# Patient Record
Sex: FEMALE | Race: ASIAN | NOT HISPANIC OR LATINO | URBAN - METROPOLITAN AREA
[De-identification: names, ages, dates, MRNs, and addresses within clinical notes are randomized per-mention and may not be internally consistent; named-entity substitution may affect disease eponyms.]

---

## 2017-11-13 ENCOUNTER — EMERGENCY (EMERGENCY)
Facility: HOSPITAL | Age: 82
LOS: 1 days | Discharge: HOME | End: 2017-11-13
Attending: EMERGENCY MEDICINE | Admitting: EMERGENCY MEDICINE
Payer: MEDICARE

## 2017-11-13 VITALS
WEIGHT: 130.07 LBS | OXYGEN SATURATION: 95 % | HEIGHT: 62 IN | RESPIRATION RATE: 16 BRPM | DIASTOLIC BLOOD PRESSURE: 85 MMHG | TEMPERATURE: 98 F | SYSTOLIC BLOOD PRESSURE: 165 MMHG | HEART RATE: 56 BPM

## 2017-11-13 VITALS — SYSTOLIC BLOOD PRESSURE: 156 MMHG | HEART RATE: 60 BPM | DIASTOLIC BLOOD PRESSURE: 82 MMHG | RESPIRATION RATE: 16 BRPM

## 2017-11-13 LAB
ANION GAP SERPL CALC-SCNC: 3 MMOL/L — LOW (ref 5–17)
APTT BLD: 47.5 SEC — HIGH (ref 27.5–37.4)
BASOPHILS # BLD AUTO: 0.1 K/UL — SIGNIFICANT CHANGE UP (ref 0–0.2)
BASOPHILS NFR BLD AUTO: 1.9 % — SIGNIFICANT CHANGE UP (ref 0–2)
BUN SERPL-MCNC: 16 MG/DL — SIGNIFICANT CHANGE UP (ref 7–23)
CALCIUM SERPL-MCNC: 8.7 MG/DL — SIGNIFICANT CHANGE UP (ref 8.4–10.5)
CHLORIDE SERPL-SCNC: 109 MMOL/L — HIGH (ref 96–108)
CO2 SERPL-SCNC: 32 MMOL/L — HIGH (ref 22–31)
CREAT SERPL-MCNC: 0.82 MG/DL — SIGNIFICANT CHANGE UP (ref 0.5–1.3)
EOSINOPHIL # BLD AUTO: 0.1 K/UL — SIGNIFICANT CHANGE UP (ref 0–0.5)
EOSINOPHIL NFR BLD AUTO: 3.7 % — SIGNIFICANT CHANGE UP (ref 0–6)
GLUCOSE SERPL-MCNC: 94 MG/DL — SIGNIFICANT CHANGE UP (ref 70–99)
HCT VFR BLD CALC: 40.3 % — SIGNIFICANT CHANGE UP (ref 34.5–45)
HGB BLD-MCNC: 12.8 G/DL — SIGNIFICANT CHANGE UP (ref 11.5–15.5)
INR BLD: 2.55 RATIO — HIGH (ref 0.88–1.16)
LYMPHOCYTES # BLD AUTO: 1.2 K/UL — SIGNIFICANT CHANGE UP (ref 1–3.3)
LYMPHOCYTES # BLD AUTO: 33.3 % — SIGNIFICANT CHANGE UP (ref 13–44)
MCHC RBC-ENTMCNC: 30.9 PG — SIGNIFICANT CHANGE UP (ref 27–34)
MCHC RBC-ENTMCNC: 31.8 GM/DL — LOW (ref 32–36)
MCV RBC AUTO: 96.9 FL — SIGNIFICANT CHANGE UP (ref 80–100)
MONOCYTES # BLD AUTO: 0.3 K/UL — SIGNIFICANT CHANGE UP (ref 0–0.9)
MONOCYTES NFR BLD AUTO: 9.4 % — SIGNIFICANT CHANGE UP (ref 2–14)
NEUTROPHILS # BLD AUTO: 1.8 K/UL — SIGNIFICANT CHANGE UP (ref 1.8–7.4)
NEUTROPHILS NFR BLD AUTO: 51.7 % — SIGNIFICANT CHANGE UP (ref 43–77)
PLATELET # BLD AUTO: 118 K/UL — LOW (ref 150–400)
POTASSIUM SERPL-MCNC: 3.6 MMOL/L — SIGNIFICANT CHANGE UP (ref 3.5–5.3)
POTASSIUM SERPL-SCNC: 3.6 MMOL/L — SIGNIFICANT CHANGE UP (ref 3.5–5.3)
PROTHROM AB SERPL-ACNC: 28.3 SEC — HIGH (ref 9.8–12.7)
RBC # BLD: 4.16 M/UL — SIGNIFICANT CHANGE UP (ref 3.8–5.2)
RBC # FLD: 13.4 % — SIGNIFICANT CHANGE UP (ref 10.3–14.5)
SODIUM SERPL-SCNC: 144 MMOL/L — SIGNIFICANT CHANGE UP (ref 135–145)
WBC # BLD: 3.5 K/UL — LOW (ref 3.8–10.5)
WBC # FLD AUTO: 3.5 K/UL — LOW (ref 3.8–10.5)

## 2017-11-13 PROCEDURE — 85027 COMPLETE CBC AUTOMATED: CPT

## 2017-11-13 PROCEDURE — 36415 COLL VENOUS BLD VENIPUNCTURE: CPT

## 2017-11-13 PROCEDURE — 85610 PROTHROMBIN TIME: CPT

## 2017-11-13 PROCEDURE — 99283 EMERGENCY DEPT VISIT LOW MDM: CPT

## 2017-11-13 PROCEDURE — 85730 THROMBOPLASTIN TIME PARTIAL: CPT

## 2017-11-13 PROCEDURE — 99282 EMERGENCY DEPT VISIT SF MDM: CPT

## 2017-11-13 PROCEDURE — 80048 BASIC METABOLIC PNL TOTAL CA: CPT

## 2017-11-13 NOTE — ED PROVIDER NOTE - OBJECTIVE STATEMENT
pt's daughter states that mom has been bleeding from her mouth since Saturday, slow oozing of blood.  pt on coumadin. at times it will stop and then start again. no pain.

## 2017-11-13 NOTE — ED PROVIDER NOTE - ENMT, MLM
pt missing most of her teeth, very small area of oozing from the L lower molar area around the lower edge of molar. bleeding is minor

## 2019-01-01 ENCOUNTER — OUTPATIENT (OUTPATIENT)
Dept: OUTPATIENT SERVICES | Facility: HOSPITAL | Age: 84
LOS: 1 days | End: 2019-01-01
Payer: MEDICARE

## 2019-01-01 PROCEDURE — G9001: CPT

## 2019-01-18 ENCOUNTER — EMERGENCY (EMERGENCY)
Facility: HOSPITAL | Age: 84
LOS: 1 days | Discharge: HOME | End: 2019-01-18
Attending: EMERGENCY MEDICINE | Admitting: EMERGENCY MEDICINE
Payer: COMMERCIAL

## 2019-01-18 ENCOUNTER — EMERGENCY (EMERGENCY)
Facility: HOSPITAL | Age: 84
LOS: 1 days | End: 2019-01-18
Attending: INTERNAL MEDICINE
Payer: COMMERCIAL

## 2019-01-18 VITALS
TEMPERATURE: 98 F | SYSTOLIC BLOOD PRESSURE: 144 MMHG | RESPIRATION RATE: 14 BRPM | OXYGEN SATURATION: 98 % | DIASTOLIC BLOOD PRESSURE: 74 MMHG | HEART RATE: 60 BPM

## 2019-01-18 VITALS
RESPIRATION RATE: 18 BRPM | TEMPERATURE: 98 F | SYSTOLIC BLOOD PRESSURE: 176 MMHG | WEIGHT: 126.1 LBS | DIASTOLIC BLOOD PRESSURE: 84 MMHG | OXYGEN SATURATION: 96 % | HEART RATE: 68 BPM | HEIGHT: 61 IN

## 2019-01-18 VITALS
OXYGEN SATURATION: 98 % | TEMPERATURE: 99 F | DIASTOLIC BLOOD PRESSURE: 115 MMHG | SYSTOLIC BLOOD PRESSURE: 154 MMHG | RESPIRATION RATE: 15 BRPM | WEIGHT: 126.1 LBS | HEART RATE: 68 BPM

## 2019-01-18 VITALS
RESPIRATION RATE: 18 BRPM | DIASTOLIC BLOOD PRESSURE: 95 MMHG | TEMPERATURE: 98 F | HEART RATE: 84 BPM | SYSTOLIC BLOOD PRESSURE: 174 MMHG | WEIGHT: 126.1 LBS

## 2019-01-18 VITALS
SYSTOLIC BLOOD PRESSURE: 134 MMHG | OXYGEN SATURATION: 96 % | RESPIRATION RATE: 16 BRPM | TEMPERATURE: 98 F | HEART RATE: 64 BPM | DIASTOLIC BLOOD PRESSURE: 54 MMHG

## 2019-01-18 LAB
ALBUMIN SERPL ELPH-MCNC: 3.2 G/DL — LOW (ref 3.3–5)
ALP SERPL-CCNC: 93 U/L — SIGNIFICANT CHANGE UP (ref 30–120)
ALT FLD-CCNC: 18 U/L DA — SIGNIFICANT CHANGE UP (ref 10–60)
ANION GAP SERPL CALC-SCNC: 8 MMOL/L — SIGNIFICANT CHANGE UP (ref 5–17)
ANION GAP SERPL CALC-SCNC: 9 MMOL/L — SIGNIFICANT CHANGE UP (ref 5–17)
APTT BLD: 48.9 SEC — HIGH (ref 28.5–37)
APTT BLD: 51.5 SEC — HIGH (ref 28.5–37)
AST SERPL-CCNC: 29 U/L — SIGNIFICANT CHANGE UP (ref 10–40)
BASOPHILS # BLD AUTO: 0.04 K/UL — SIGNIFICANT CHANGE UP (ref 0–0.2)
BASOPHILS # BLD AUTO: 0.06 K/UL — SIGNIFICANT CHANGE UP (ref 0–0.2)
BASOPHILS NFR BLD AUTO: 0.8 % — SIGNIFICANT CHANGE UP (ref 0–2)
BASOPHILS NFR BLD AUTO: 1.5 % — SIGNIFICANT CHANGE UP (ref 0–2)
BILIRUB SERPL-MCNC: 0.8 MG/DL — SIGNIFICANT CHANGE UP (ref 0.2–1.2)
BUN SERPL-MCNC: 18 MG/DL — SIGNIFICANT CHANGE UP (ref 7–23)
BUN SERPL-MCNC: 22 MG/DL — SIGNIFICANT CHANGE UP (ref 7–23)
CALCIUM SERPL-MCNC: 8.5 MG/DL — SIGNIFICANT CHANGE UP (ref 8.4–10.5)
CALCIUM SERPL-MCNC: 8.9 MG/DL — SIGNIFICANT CHANGE UP (ref 8.4–10.5)
CHLORIDE SERPL-SCNC: 107 MMOL/L — SIGNIFICANT CHANGE UP (ref 96–108)
CHLORIDE SERPL-SCNC: 108 MMOL/L — SIGNIFICANT CHANGE UP (ref 96–108)
CO2 SERPL-SCNC: 28 MMOL/L — SIGNIFICANT CHANGE UP (ref 22–31)
CO2 SERPL-SCNC: 28 MMOL/L — SIGNIFICANT CHANGE UP (ref 22–31)
CREAT SERPL-MCNC: 0.84 MG/DL — SIGNIFICANT CHANGE UP (ref 0.5–1.3)
CREAT SERPL-MCNC: 0.88 MG/DL — SIGNIFICANT CHANGE UP (ref 0.5–1.3)
EOSINOPHIL # BLD AUTO: 0.2 K/UL — SIGNIFICANT CHANGE UP (ref 0–0.5)
EOSINOPHIL # BLD AUTO: 0.21 K/UL — SIGNIFICANT CHANGE UP (ref 0–0.5)
EOSINOPHIL NFR BLD AUTO: 3.8 % — SIGNIFICANT CHANGE UP (ref 0–6)
EOSINOPHIL NFR BLD AUTO: 5.2 % — SIGNIFICANT CHANGE UP (ref 0–6)
GLUCOSE SERPL-MCNC: 105 MG/DL — HIGH (ref 70–99)
GLUCOSE SERPL-MCNC: 148 MG/DL — HIGH (ref 70–99)
HCT VFR BLD CALC: 38.4 % — SIGNIFICANT CHANGE UP (ref 34.5–45)
HCT VFR BLD CALC: 39.1 % — SIGNIFICANT CHANGE UP (ref 34.5–45)
HGB BLD-MCNC: 13 G/DL — SIGNIFICANT CHANGE UP (ref 11.5–15.5)
HGB BLD-MCNC: 13.1 G/DL — SIGNIFICANT CHANGE UP (ref 11.5–15.5)
IMM GRANULOCYTES NFR BLD AUTO: 0 % — SIGNIFICANT CHANGE UP (ref 0–1.5)
IMM GRANULOCYTES NFR BLD AUTO: 0.2 % — SIGNIFICANT CHANGE UP (ref 0–1.5)
INR BLD: 3.28 RATIO — HIGH (ref 0.88–1.16)
INR BLD: 3.51 RATIO — HIGH (ref 0.88–1.16)
LYMPHOCYTES # BLD AUTO: 1.38 K/UL — SIGNIFICANT CHANGE UP (ref 1–3.3)
LYMPHOCYTES # BLD AUTO: 1.88 K/UL — SIGNIFICANT CHANGE UP (ref 1–3.3)
LYMPHOCYTES # BLD AUTO: 34.4 % — SIGNIFICANT CHANGE UP (ref 13–44)
LYMPHOCYTES # BLD AUTO: 35.3 % — SIGNIFICANT CHANGE UP (ref 13–44)
MAGNESIUM SERPL-MCNC: 1.9 MG/DL — SIGNIFICANT CHANGE UP (ref 1.6–2.6)
MCHC RBC-ENTMCNC: 32.3 PG — SIGNIFICANT CHANGE UP (ref 27–34)
MCHC RBC-ENTMCNC: 32.3 PG — SIGNIFICANT CHANGE UP (ref 27–34)
MCHC RBC-ENTMCNC: 33.5 GM/DL — SIGNIFICANT CHANGE UP (ref 32–36)
MCHC RBC-ENTMCNC: 33.9 GM/DL — SIGNIFICANT CHANGE UP (ref 32–36)
MCV RBC AUTO: 95.3 FL — SIGNIFICANT CHANGE UP (ref 80–100)
MCV RBC AUTO: 96.3 FL — SIGNIFICANT CHANGE UP (ref 80–100)
MONOCYTES # BLD AUTO: 0.33 K/UL — SIGNIFICANT CHANGE UP (ref 0–0.9)
MONOCYTES # BLD AUTO: 0.48 K/UL — SIGNIFICANT CHANGE UP (ref 0–0.9)
MONOCYTES NFR BLD AUTO: 8.2 % — SIGNIFICANT CHANGE UP (ref 2–14)
MONOCYTES NFR BLD AUTO: 9 % — SIGNIFICANT CHANGE UP (ref 2–14)
NEUTROPHILS # BLD AUTO: 2.03 K/UL — SIGNIFICANT CHANGE UP (ref 1.8–7.4)
NEUTROPHILS # BLD AUTO: 2.71 K/UL — SIGNIFICANT CHANGE UP (ref 1.8–7.4)
NEUTROPHILS NFR BLD AUTO: 50.7 % — SIGNIFICANT CHANGE UP (ref 43–77)
NEUTROPHILS NFR BLD AUTO: 50.9 % — SIGNIFICANT CHANGE UP (ref 43–77)
NRBC # BLD: 0 /100 WBCS — SIGNIFICANT CHANGE UP (ref 0–0)
PLATELET # BLD AUTO: 122 K/UL — LOW (ref 150–400)
PLATELET # BLD AUTO: 130 K/UL — LOW (ref 150–400)
POTASSIUM SERPL-MCNC: 2.7 MMOL/L — CRITICAL LOW (ref 3.5–5.3)
POTASSIUM SERPL-MCNC: 3.3 MMOL/L — LOW (ref 3.5–5.3)
POTASSIUM SERPL-SCNC: 2.7 MMOL/L — CRITICAL LOW (ref 3.5–5.3)
POTASSIUM SERPL-SCNC: 3.3 MMOL/L — LOW (ref 3.5–5.3)
PROT SERPL-MCNC: 6.3 G/DL — SIGNIFICANT CHANGE UP (ref 6–8.3)
PROTHROM AB SERPL-ACNC: 37.1 SEC — HIGH (ref 10–12.9)
PROTHROM AB SERPL-ACNC: 39.8 SEC — HIGH (ref 10–12.9)
RBC # BLD: 4.03 M/UL — SIGNIFICANT CHANGE UP (ref 3.8–5.2)
RBC # BLD: 4.06 M/UL — SIGNIFICANT CHANGE UP (ref 3.8–5.2)
RBC # FLD: 13.3 % — SIGNIFICANT CHANGE UP (ref 10.3–14.5)
RBC # FLD: 13.4 % — SIGNIFICANT CHANGE UP (ref 10.3–14.5)
SODIUM SERPL-SCNC: 143 MMOL/L — SIGNIFICANT CHANGE UP (ref 135–145)
SODIUM SERPL-SCNC: 145 MMOL/L — SIGNIFICANT CHANGE UP (ref 135–145)
WBC # BLD: 4.01 K/UL — SIGNIFICANT CHANGE UP (ref 3.8–10.5)
WBC # BLD: 5.32 K/UL — SIGNIFICANT CHANGE UP (ref 3.8–10.5)
WBC # FLD AUTO: 4.01 K/UL — SIGNIFICANT CHANGE UP (ref 3.8–10.5)
WBC # FLD AUTO: 5.32 K/UL — SIGNIFICANT CHANGE UP (ref 3.8–10.5)

## 2019-01-18 PROCEDURE — 99284 EMERGENCY DEPT VISIT MOD MDM: CPT | Mod: 25

## 2019-01-18 PROCEDURE — 36415 COLL VENOUS BLD VENIPUNCTURE: CPT

## 2019-01-18 PROCEDURE — 80048 BASIC METABOLIC PNL TOTAL CA: CPT

## 2019-01-18 PROCEDURE — 70450 CT HEAD/BRAIN W/O DYE: CPT

## 2019-01-18 PROCEDURE — 85027 COMPLETE CBC AUTOMATED: CPT

## 2019-01-18 PROCEDURE — 80053 COMPREHEN METABOLIC PANEL: CPT

## 2019-01-18 PROCEDURE — 70486 CT MAXILLOFACIAL W/O DYE: CPT

## 2019-01-18 PROCEDURE — 85610 PROTHROMBIN TIME: CPT

## 2019-01-18 PROCEDURE — 30901 CONTROL OF NOSEBLEED: CPT

## 2019-01-18 PROCEDURE — 70450 CT HEAD/BRAIN W/O DYE: CPT | Mod: 26

## 2019-01-18 PROCEDURE — 99284 EMERGENCY DEPT VISIT MOD MDM: CPT

## 2019-01-18 PROCEDURE — 85730 THROMBOPLASTIN TIME PARTIAL: CPT

## 2019-01-18 PROCEDURE — 93005 ELECTROCARDIOGRAM TRACING: CPT

## 2019-01-18 PROCEDURE — 70486 CT MAXILLOFACIAL W/O DYE: CPT | Mod: 26

## 2019-01-18 PROCEDURE — 83735 ASSAY OF MAGNESIUM: CPT

## 2019-01-18 PROCEDURE — 99285 EMERGENCY DEPT VISIT HI MDM: CPT | Mod: 25

## 2019-01-18 PROCEDURE — 30901 CONTROL OF NOSEBLEED: CPT | Mod: LT,77

## 2019-01-18 PROCEDURE — 93010 ELECTROCARDIOGRAM REPORT: CPT

## 2019-01-18 PROCEDURE — 99283 EMERGENCY DEPT VISIT LOW MDM: CPT | Mod: 25

## 2019-01-18 RX ORDER — METOPROLOL TARTRATE 50 MG
50 TABLET ORAL ONCE
Qty: 0 | Refills: 0 | Status: COMPLETED | OUTPATIENT
Start: 2019-01-18 | End: 2019-01-18

## 2019-01-18 RX ORDER — AMLODIPINE BESYLATE 2.5 MG/1
5 TABLET ORAL ONCE
Qty: 0 | Refills: 0 | Status: COMPLETED | OUTPATIENT
Start: 2019-01-18 | End: 2019-01-18

## 2019-01-18 RX ORDER — POTASSIUM CHLORIDE 20 MEQ
10 PACKET (EA) ORAL ONCE
Qty: 0 | Refills: 0 | Status: COMPLETED | OUTPATIENT
Start: 2019-01-18 | End: 2019-01-18

## 2019-01-18 RX ORDER — POTASSIUM CHLORIDE 20 MEQ
40 PACKET (EA) ORAL ONCE
Qty: 0 | Refills: 0 | Status: COMPLETED | OUTPATIENT
Start: 2019-01-18 | End: 2019-01-18

## 2019-01-18 RX ORDER — OXYMETAZOLINE HYDROCHLORIDE 0.5 MG/ML
1 SPRAY NASAL ONCE
Qty: 0 | Refills: 0 | Status: COMPLETED | OUTPATIENT
Start: 2019-01-18 | End: 2019-01-18

## 2019-01-18 RX ORDER — SODIUM CHLORIDE 9 MG/ML
1000 INJECTION INTRAMUSCULAR; INTRAVENOUS; SUBCUTANEOUS ONCE
Qty: 0 | Refills: 0 | Status: COMPLETED | OUTPATIENT
Start: 2019-01-18 | End: 2019-01-18

## 2019-01-18 RX ORDER — PHYTONADIONE (VIT K1) 5 MG
5 TABLET ORAL ONCE
Qty: 0 | Refills: 0 | Status: COMPLETED | OUTPATIENT
Start: 2019-01-18 | End: 2019-01-18

## 2019-01-18 RX ORDER — POTASSIUM CHLORIDE 20 MEQ
1 PACKET (EA) ORAL
Qty: 30 | Refills: 0
Start: 2019-01-18 | End: 2019-02-16

## 2019-01-18 RX ORDER — SODIUM CHLORIDE 9 MG/ML
3 INJECTION INTRAMUSCULAR; INTRAVENOUS; SUBCUTANEOUS ONCE
Qty: 0 | Refills: 0 | Status: COMPLETED | OUTPATIENT
Start: 2019-01-18 | End: 2019-01-18

## 2019-01-18 RX ADMIN — Medication 50 MILLIGRAM(S): at 08:21

## 2019-01-18 RX ADMIN — OXYMETAZOLINE HYDROCHLORIDE 1 SPRAY(S): 0.5 SPRAY NASAL at 21:15

## 2019-01-18 RX ADMIN — Medication 40 MILLIEQUIVALENT(S): at 21:52

## 2019-01-18 RX ADMIN — SODIUM CHLORIDE 3 MILLILITER(S): 9 INJECTION INTRAMUSCULAR; INTRAVENOUS; SUBCUTANEOUS at 21:10

## 2019-01-18 RX ADMIN — AMLODIPINE BESYLATE 5 MILLIGRAM(S): 2.5 TABLET ORAL at 08:21

## 2019-01-18 RX ADMIN — Medication 100 MILLIEQUIVALENT(S): at 10:00

## 2019-01-18 RX ADMIN — Medication 5 MILLIGRAM(S): at 21:28

## 2019-01-18 RX ADMIN — SODIUM CHLORIDE 1000 MILLILITER(S): 9 INJECTION INTRAMUSCULAR; INTRAVENOUS; SUBCUTANEOUS at 21:28

## 2019-01-18 RX ADMIN — Medication 40 MILLIEQUIVALENT(S): at 10:05

## 2019-01-18 NOTE — ED PROVIDER NOTE - OBJECTIVE STATEMENT
Dr. May Note: 83F arrived by ambulance with daughter at bedside presenting with recurrent L epistaxis this morning s/p fall face-plant 4d ago (no imaging, saw PCP, prescribed pain cream, PCP in [place], pt on coumadin, had nose bleeding at that time that was controllable), no assoc vision change, headache, vomiting, or reported new weakness or sensory change.  Of note, pt is Mandarin speaking, prefers daughter to translate, translation services offered but declined.  Pt did not take her bp meds this morning.

## 2019-01-18 NOTE — ED PROVIDER NOTE - ATTENDING CONTRIBUTION TO CARE
I, Dr Cordova, have personally performed a face to face diagnostic evaluation on this patient with the PA/NP. I have reviewed the PA/NP's note and agree with the history, Physical exam and plan of care, As per history pt is a 84yo female with A-fib on coumadin c/o nose bleed x today. pt reports she fell 4 days ago, seen by unknown ent in [place] and had nasal procedure done. pt reports bleeding resolved at this time. pt reports bleeding started again today, was seen at sy ed and had packing placed. pt reports bleeding resolved until this evening. On exam some clotted blood both nostril and tongue coated with blood will discharged home when bleeding get control.

## 2019-01-18 NOTE — ED PROVIDER NOTE - PROGRESS NOTE DETAILS
Dr. May Note: pt observed for several hours, no further nasal bleeding, pt packed with small murocel, refer to ENT, pt with hypokalemia without arrythmia, will replete and f/u ENT and PCP outpt.

## 2019-01-18 NOTE — ED ADULT TRIAGE NOTE - CHIEF COMPLAINT QUOTE
Reports epistaxis, went to [facility] and bleeding was controlled, and was discharged. Pt had a recent fall. Reports epistaxis, went to [facility] and bleeding was controlled, and was discharged. But bleeding came back so they came here. Pt had a recent fall.

## 2019-01-18 NOTE — ED ADULT NURSE NOTE - CHIEF COMPLAINT QUOTE
" Nosebleeding since 6AM today, h/o fall on Monday at [facility],had facial and nasal injury "

## 2019-01-18 NOTE — ED PROVIDER NOTE - NSFOLLOWUPINSTRUCTIONS_ED_ALL_ED_FT
1. Follow up ENT doctor for packing removal and further care.  2. For nose bleeding, keep pressure on nose for 15 minutes.  If still bleeding, return to ER.  3. You have a nasal bone fracture, follow up ENT for re-evaluation and any possible surgical correction.  4. You also have low potassium, start taking potassium supplements and follow up with PCP for repeat labs and further evaluation.  5. Anytime you fall or hit your head, you should go to the ER and consider getting cat scan of head, even if you have no symptoms, given you are on blood thinners and risk for head bleed is high.

## 2019-01-18 NOTE — ED ADULT NURSE NOTE - NSIMPLEMENTINTERV_GEN_ALL_ED
Implemented All Fall with Harm Risk Interventions:  [place] to call system. Call bell, personal items and telephone within reach. Instruct patient to call for assistance. Room bathroom lighting operational. Non-slip footwear when patient is off stretcher. Physically safe environment: no spills, clutter or unnecessary equipment. Stretcher in lowest position, wheels locked, appropriate side rails in place. Provide visual cue, wrist band, yellow gown, etc. Monitor gait and stability. Monitor for mental status changes and reorient to person, place, and time. Review medications for side effects contributing to fall risk. Reinforce activity limits and safety measures with patient and family. Provide visual clues: red socks.

## 2019-01-18 NOTE — ED PROVIDER NOTE - OBJECTIVE STATEMENT
pt is a 82yo female with A-fib on coumadin c/o nose bleed x today. pt reports she fell 4 days ago, seen by unknown ent in [place] and had nasal procedure done. pt reports bleeding resolved at this time. pt reports bleeding started again today, was seen at  ed and had packing placed. pt reports bleeding resolved until this evening.

## 2019-01-18 NOTE — ED PROVIDER NOTE - MEDICAL DECISION MAKING DETAILS
Dr. May Note: head injury in elderly on a/c, r/o ICH, control epistaxis, check labs for anemia and coagulopathy

## 2019-01-18 NOTE — ED PROVIDER NOTE - PHYSICAL EXAMINATION
Left anterior nasal bleeding from septum without septal hematoma, minimal bleeding at this time, no posterior bleeding signs, dried blood oropharynx.  +nasal bone contusion and tenderness no obvious deformity, +left forehead abrasion and contusion.  No c/t/l spine td.

## 2019-01-18 NOTE — ED PROCEDURE NOTE - CPROC ED NASAL DETAIL1
The source of the bleeding was clearly identified./The anatomic location was packed./The bleeding stopped.

## 2019-01-18 NOTE — ED ADULT TRIAGE NOTE - CHIEF COMPLAINT QUOTE
Pt is discharged fro ED after tx nose bleed, still bleeding heavily as per pt's daughter, Pt is on Warfarin.  denies dizziness or lightheadedness at this time.

## 2019-01-18 NOTE — ED ADULT NURSE NOTE - CHPI ED NUR SYMPTOMS NEG
no chills/no loss of consciousness/no nausea/no numbness/no syncope/no vomiting/no bleeding gums/no weakness

## 2019-01-18 NOTE — ED ADULT NURSE REASSESSMENT NOTE - NS ED NURSE REASSESS COMMENT FT1
potassium low and being replaced tolerating  and on monitor pending observation
no bleeding from either nares  pt re evaluated by md and to be d'c/d  iv d'c/d  no s/s infiltration upon removal  pt discharged stable and ambulatory in nad at present d/c instruction reinforced and pt verbalized understanding vital signs as charted  NO LIFTING, STRAINING, NO BENDING OVER. NO NOSE BLOWING. COUGH OR SNEEZE WITH MOUTH OPEN. NO HOT SHOWERS. NO ALCOHOLIC BEVERAGES. NO HOT FOODS OR LIQUIDS may cause bleeding to recur and daughter verbalized understanding.

## 2019-01-18 NOTE — ED PROVIDER NOTE - THROAT FINDINGS
no redness/uvula midline/no exudate/NO STRIDOR/blood noted in pharynx/NO DROOLING/NO TONGUE ELEVATION

## 2019-01-18 NOTE — ED ADULT NURSE NOTE - OBJECTIVE STATEMENT
Pt was seen and treated earlier in the ED for epistaxis s/p fall on Monday. Pt now returns to the ED with small oozing from the left nostril and reports of dripping down the back of her throat. Pt is breathing without any difficulty, airway is patent. Pt denies any pain. Pt has healing bruising on her face, yellowish and faint purple.

## 2019-01-18 NOTE — ED ADULT NURSE NOTE - CAS EDN DISCHARGE ASSESSMENT
No adverse reaction to first time med in ED/Awake/Symptoms improved/Dressing clean and dry/Alert and oriented to person, place and time

## 2019-01-18 NOTE — ED PROVIDER NOTE - CARE PLAN
Principal Discharge DX:	Closed head injury, initial encounter  Secondary Diagnosis:	Epistaxis Principal Discharge DX:	Closed head injury, initial encounter  Secondary Diagnosis:	Epistaxis  Secondary Diagnosis:	Hypokalemia

## 2019-01-18 NOTE — ED ADULT TRIAGE NOTE - CHIEF COMPLAINT QUOTE
" Nosebleeding since 6AM today, h/o fall on Monday at [facility],had facial and nasal injury "

## 2019-01-18 NOTE — ED PROVIDER NOTE - NSFOLLOWUPCLINICS_GEN_ALL_ED_FT
[facility] - ENT  Otolaryngology (ENT)  [address] [place] [address]  Phone: [phone]  Fax:   Follow Up Time: 4-6 Days

## 2019-01-19 VITALS
HEART RATE: 71 BPM | TEMPERATURE: 98 F | DIASTOLIC BLOOD PRESSURE: 76 MMHG | SYSTOLIC BLOOD PRESSURE: 165 MMHG | OXYGEN SATURATION: 97 % | RESPIRATION RATE: 15 BRPM

## 2019-01-19 LAB
ALBUMIN SERPL ELPH-MCNC: 3.1 G/DL — LOW (ref 3.3–5)
ALP SERPL-CCNC: 100 U/L — SIGNIFICANT CHANGE UP (ref 40–120)
ALT FLD-CCNC: 19 U/L — SIGNIFICANT CHANGE UP (ref 12–78)
ANION GAP SERPL CALC-SCNC: 4 MMOL/L — LOW (ref 5–17)
AST SERPL-CCNC: 22 U/L — SIGNIFICANT CHANGE UP (ref 15–37)
BILIRUB SERPL-MCNC: 0.7 MG/DL — SIGNIFICANT CHANGE UP (ref 0.2–1.2)
BLD GP AB SCN SERPL QL: SIGNIFICANT CHANGE UP
BUN SERPL-MCNC: 24 MG/DL — HIGH (ref 7–23)
CALCIUM SERPL-MCNC: 8.3 MG/DL — LOW (ref 8.5–10.1)
CHLORIDE SERPL-SCNC: 112 MMOL/L — HIGH (ref 96–108)
CO2 SERPL-SCNC: 30 MMOL/L — SIGNIFICANT CHANGE UP (ref 22–31)
CREAT SERPL-MCNC: 0.78 MG/DL — SIGNIFICANT CHANGE UP (ref 0.5–1.3)
GLUCOSE SERPL-MCNC: 120 MG/DL — HIGH (ref 70–99)
HCT VFR BLD CALC: 37.1 % — SIGNIFICANT CHANGE UP (ref 34.5–45)
HGB BLD-MCNC: 12.3 G/DL — SIGNIFICANT CHANGE UP (ref 11.5–15.5)
INR BLD: 3.08 RATIO — HIGH (ref 0.88–1.16)
MCHC RBC-ENTMCNC: 31.3 PG — SIGNIFICANT CHANGE UP (ref 27–34)
MCHC RBC-ENTMCNC: 33.2 GM/DL — SIGNIFICANT CHANGE UP (ref 32–36)
MCV RBC AUTO: 94.4 FL — SIGNIFICANT CHANGE UP (ref 80–100)
NRBC # BLD: 0 /100 WBCS — SIGNIFICANT CHANGE UP (ref 0–0)
PLATELET # BLD AUTO: 124 K/UL — LOW (ref 150–400)
POTASSIUM SERPL-MCNC: 4 MMOL/L — SIGNIFICANT CHANGE UP (ref 3.5–5.3)
POTASSIUM SERPL-SCNC: 4 MMOL/L — SIGNIFICANT CHANGE UP (ref 3.5–5.3)
PROT SERPL-MCNC: 6.2 G/DL — SIGNIFICANT CHANGE UP (ref 6–8.3)
PROTHROM AB SERPL-ACNC: 36.3 SEC — HIGH (ref 10–12.9)
RBC # BLD: 3.93 M/UL — SIGNIFICANT CHANGE UP (ref 3.8–5.2)
RBC # FLD: 13.6 % — SIGNIFICANT CHANGE UP (ref 10.3–14.5)
SODIUM SERPL-SCNC: 146 MMOL/L — HIGH (ref 135–145)
WBC # BLD: 5.37 K/UL — SIGNIFICANT CHANGE UP (ref 3.8–10.5)
WBC # FLD AUTO: 5.37 K/UL — SIGNIFICANT CHANGE UP (ref 3.8–10.5)

## 2019-01-19 PROCEDURE — 70160 X-RAY EXAM OF NASAL BONES: CPT

## 2019-01-19 PROCEDURE — 85610 PROTHROMBIN TIME: CPT

## 2019-01-19 PROCEDURE — 36415 COLL VENOUS BLD VENIPUNCTURE: CPT

## 2019-01-19 PROCEDURE — 86901 BLOOD TYPING SEROLOGIC RH(D): CPT

## 2019-01-19 PROCEDURE — 86900 BLOOD TYPING SEROLOGIC ABO: CPT

## 2019-01-19 PROCEDURE — 70160 X-RAY EXAM OF NASAL BONES: CPT | Mod: 26

## 2019-01-19 PROCEDURE — 80053 COMPREHEN METABOLIC PANEL: CPT

## 2019-01-19 PROCEDURE — 99284 EMERGENCY DEPT VISIT MOD MDM: CPT | Mod: 25

## 2019-01-19 PROCEDURE — 85027 COMPLETE CBC AUTOMATED: CPT

## 2019-01-19 PROCEDURE — 86850 RBC ANTIBODY SCREEN: CPT

## 2019-01-19 RX ORDER — SODIUM CHLORIDE 9 MG/ML
1000 INJECTION INTRAMUSCULAR; INTRAVENOUS; SUBCUTANEOUS ONCE
Qty: 0 | Refills: 0 | Status: COMPLETED | OUTPATIENT
Start: 2019-01-19 | End: 2019-01-19

## 2019-01-19 RX ADMIN — SODIUM CHLORIDE 125 MILLILITER(S): 9 INJECTION INTRAMUSCULAR; INTRAVENOUS; SUBCUTANEOUS at 00:40

## 2019-01-19 NOTE — ED ADULT NURSE NOTE - NSIMPLEMENTINTERV_GEN_ALL_ED
Implemented All Fall with Harm Risk Interventions:  [place] to call system. Call bell, personal items and telephone within reach. Instruct patient to call for assistance. Room bathroom lighting operational. Non-slip footwear when patient is off stretcher. Physically safe environment: no spills, clutter or unnecessary equipment. Stretcher in lowest position, wheels locked, appropriate side rails in place. Provide visual cue, wrist band, yellow gown, etc. Monitor gait and stability. Monitor for mental status changes and reorient to person, place, and time. Review medications for side effects contributing to fall risk. Reinforce activity limits and safety measures with patient and family. Provide visual clues: red socks.

## 2019-01-19 NOTE — ED PROVIDER NOTE - CARE PROVIDER_API CALL
Allen Aguayo), Otolaryngology  [address] [place] [address] [address]  [place] [address]  Phone: [phone]  Fax: [phone]

## 2019-01-19 NOTE — ED PROVIDER NOTE - PROGRESS NOTE DETAILS
Dw Dr Mancilla. He has maday Aguayo, who will see pt in office this am, from 9a-12p. Pt awaiting family to return to ed. Dw Pts daughter who just arrived in ed. Will take pt to see Dr Aguayo now in the office. Dw Pts daughter who just arrived in ed. Will take pt to see Dr Aguayo now in the office.  All results were explained to patient and family and a copy of all available results from past few days given.  Discussed epistaxis prec/ inst, importance of ent for definitive rx and to return with any changes.

## 2019-01-19 NOTE — ED PROVIDER NOTE - OBJECTIVE STATEMENT
84 y/o  Female h/o Atrial fibrillation  Diabetes Hypertension  Pacemaker  The patient fell 4 days ago sustaining injuries to the head and face resulting in nasal fractures. The patient takes coumadin for atrial fibrillation. She was seen at [facility] on two occasions Friday 1/19/2019. for recurrent nose bleeding. 84 y/o  Female h/o Atrial fibrillation  Diabetes Hypertension  Pacemaker  The patient fell 4 days ago sustaining injuries to the head and face resulting in nasal fractures. The patient takes coumadin for atrial fibrillation. She was seen at [facility] on two occasions Friday 1/19/2019 for recurrent nose bleeding. She was treated for the  nose bleeding with nasal packing and vitamin K to correct an elevated INR, also blood pressure management and potassium repletion. CT scan noted B/L nasal fractures, no intracerebral bleeding. She came to [facility] when family noted that she continued to have nose bleed. No cp NO sob NO ACUTE STROKE SYMPTOMS. 82 y/o  Female h/o Atrial fibrillation  Diabetes Hypertension  Pacemaker  The patient fell 4 days ago sustaining injuries to the head and face resulting in nasal fractures. The patient takes coumadin for atrial fibrillation. She was seen at [facility] on two occasions Friday 1/19/2019 for recurrent nose bleeding. She was treated for the  nose bleeding with nasal packing and vitamin K to correct an elevated INR, also blood pressure management and potassium repletion. CT scan noted B/L nasal fractures, no intracerebral bleeding. She came to [facility] when family noted that she continued to have nose bleed. No cp NO sob NO ACUTE STROKE SYMPTOMS.  0705 Patient stable. No acute distress noted. Asleep but arousable MSpencer

## 2019-01-19 NOTE — ED PROVIDER NOTE - CARE PLAN
Principal Discharge DX:	Epistaxis  Secondary Diagnosis:	Nasal fracture  Secondary Diagnosis:	Hyperprothrombinemia

## 2019-01-19 NOTE — ED ADULT NURSE NOTE - CHIEF COMPLAINT QUOTE
Reports epistaxis, went to [facility] and bleeding was controlled, and was discharged. Pt had a recent fall.

## 2019-01-19 NOTE — ED PROVIDER NOTE - NSFOLLOWUPINSTRUCTIONS_ED_ALL_ED_FT
1) GO directly to ENT Office now, Dr Aguayo - [address] [place] [address]. They will see you in office now.  2) Return to Emergency room for any worsening or persistent pain, weakness, fever, worsening or persistent bleeding, dizziness, or any other concerning symptoms.  3) See attached instruction sheets for additional information, including information regarding signs and symptoms to look out for, reasons to seek immediate care and other important instructions.

## 2019-01-19 NOTE — ED PROVIDER NOTE - SIGNIFICANT NEGATIVE FINDINGS
no headache, no neck pain, no chest pain, no SOB, no palpitations, no n/v/d, no urinary symptoms,  no neuro changes.

## 2019-01-23 DIAGNOSIS — Z71.89 OTHER SPECIFIED COUNSELING: ICD-10-CM

## 2020-05-21 NOTE — ED ADULT NURSE NOTE - NS_ED_NURSE_TEACHING_TOPIC_ED_A_ED
Elliptical Excision Additional Text (Leave Blank If You Do Not Want): The margin was drawn around the clinically apparent lesion.  An elliptical shape was then drawn on the skin incorporating the lesion and margins.  Incisions were then made along these lines to the appropriate tissue plane and the lesion was extirpated. ENT/Other specify

## 2020-08-18 NOTE — ED ADULT NURSE NOTE - CAS EDP DISCH TYPE
Dermatology Office Visit Note    Assessment and Plan    Seborrheic dermatitis, scalp, mild, not at goal    The diagnosis and etiology was discussed, as well as approach to treatment. We discussed there is no cure but symptoms can be controlled in most cases. I do not see a reason to suspect underlying neurologic or immunodeficiency as underlying cause.    -Continue ketoconazole 2% shampoo. Apply to the scalp and affected areas as a shampoo. Leave on for 5 minutes. Then rinse it off. Do this every day until the scaling resolves and then do this 2-3 times each week as maintenance therapy.    -Continue clobetasol 0.05% solution as needed as directed.    Intertrigo vs Seborrheic Dermatitis of gluteal crease-patient reports this is much improved.  Diagnosis was discussed.    We discussed that she will continue to apply Ketoconazole 2% cream to apply twice weekly as needed for maintenance. Refills are provided.    For Flares:  -Mix Triamcinolone 0.1% cream 50/50 with ketoconazole 2% cream, then apply twice daily for 2 weeks. Then discontinue Triamcinolone 0.1% cream and use ketoconazole 2% cream twice weekly alone as maintenance.       Nummular dermatitis, mild,at goal      The diagnosis was discussed, including its more refractory nature and waxing and waning course. We discussed treatment options including topical steroids, topical calcineurin inhibitors, nbUVB, IM kenalog and systemic immunosuppressants as well as combinations of aforementioned treatments. Antihistamines can be used as adjuvant therapy but most likely work through sedative effects rather than actually decreasing pruritus.     Reviewed dry skin care and the importance of using mild soaps and emollients;  a handout was given. Recommended using a gentle, fragrance-free soap and frequent moisturization. Recommended moisturizers (Vanicream, Vaseline, Cetaphil, CeraVe) were reviewed. Discussed avoidance of fragranced products.    The patient will continue to  apply mometasone 0.1% ointment twice daily for 3 more days, then as needed.  We reviewed that you should treat with your hands and not with your eyes. We reviewed the difference between postinflammatory hypo-and hyperpigmentation and active eczema. The risks and benefits of topical corticosteroids discussed including cataracts, systemic absorption, skin atrophy and striae formation with long-term use and instructed to avoid face, genitals and eyelids.      Alopecia - nonscarring. ddx includes Androgenic alopecia with or without superimposed Telogen effluvium  -Patient does report KETO dietary changes.    -The patient is provided with the hair loss in women questionnaire and will return in 1 month to review and discuss further.      Keratoelastoidosis marginalis  The diagnosis was discussed. Reassurance provided this is a benign condition. Discussed it is relatively rare in the literature but I believe it is due to under-reporting as most cases are mild. There is thought to be a genetic component to this condition and it is not dangerous. There are no satisfactory treatments.      Orders Placed This Encounter   • ketoconazole (NIZORAL) 2 % cream     Sig: At home: Mix Triamcinolone 0.1% cream 50/50 with ketoconazole 2% cream, then apply twice daily for 2 weeks. Then discontinue Triamcinolone 0.1% cream and use ketoconazole 2% cream twice weekly alone as maintenance.     Dispense:  60 g     Refill:  0   • triamcinolone (ARISTOCORT) 0.1 % cream     Sig: At home: Mix Triamcinolone 0.1% cream 50/50 with ketoconazole 2% cream, then apply twice daily for 2 weeks for flares. Then discontinue Triamcinolone 0.1% cream and use ketoconazole 2% cream twice weekly alone as maintenance     Dispense:  30 g     Refill:  0       Follow up:  Return for 1 month for dermatiis and hair loss.  -----------------------------------------------------------------------------------------------------------  Chief Complaint   Patient presents with    • Office Visit     follow up rash       HISTORY OF PRESENT ILLNESS:     The patient is a 44 year old female who goes by the name Bandar.      The patient presents in 4.5   week  followup regarding  Nummular dermatitis.    Location:  The rash started on the following part of the body:   bilateral shins.    Quality:     The rash  is asymptomatic   Severity:    The severity of the symptoms are none  Modifying Factors:  Treatments currently being used include mometasone 0.1% ointment to the shins only if she feels that it is flared.  The last time she applied ointment was last night.      Does the patient think that  the rash is getting better?    Yes      The patient is also here in follow up of Intertrigo vs Seborrheic Dermatitis of gluteal crease.  She was treated with a mix of Triamcinolone 0.1% cream 50/50 with ketoconazole 2% cream, then apply twice daily for 2 weeks. Then discontinue Triamcinolone 0.1% cream and use ketoconazole 2% cream twice weekly alone as maintenance. This is much improved.  She is now treating with ketoconazole only.  She is wondering if she can get a prescription for ketoconazole as she is almost out.    Seborrheic dermatitis of the scalp is currently being treated with ketoconazole 2% shampoo and clobetasol solution to the scalp.  The patient reports that this is well controlled.      She would like to discuss hair/loss thinning.      Additionally, she would like to discuss the warts on her hands that have been present for years..  No home treatments.        REVIEW OF SYSTEMS:  Constitutional:  In general, the patient feels well:  Yes  Integument:  The patient denies any other signs or symptoms of skin disease:  Yes  Cardiovascular:  The patient has a pacemaker:  No  The patient has a defibrillator:  No  Hematologic:  The patient bleeds easily because of being on aspirin or an anticoagulant:  No    The patient is pregnant:           [] Yes   [x] No    [] Not applicable.  The patient is  breastfeeding:  [] Yes   [x] No    [] Not applicable.    ALLERGIES:   Allergen Reactions   • Dust      Asthma symptoms   • Mold   (Environmental)      Allergic asthma       Current Outpatient Medications   Medication Sig   • cetirizine (ZYRTEC) 10 MG tablet TAKE 1 TABLET BY MOUTH DAILY   • fluticasone (FLONASE) 50 MCG/ACT nasal spray SHAKE LIQUID AND USE 2 SPRAYS IN EACH NOSTRIL DAILY   • clobetasol (TEMOVATE) 0.05 % topical solution Apply twice a day to scalp as needed for itching and scaling   • aspirin-acetaminophen-caffeine (EXCEDRIN MIGRAINE) 250-250-65 MG per tablet Take 1 tablet by mouth every 6 hours as needed for Pain.   • ketoconazole (NIZORAL) 2 % shampoo Massage into affected areas, let sit for 5-10 minutes before rinsing. Do this daily until improvement, then twice weekly for maintenance.   • mometasone (ELOCON) 0.1 % ointment Apply twice a day to affected areas of rash.   • hydrOXYzine (VISTARIL) 25 MG capsule TAKE 1 OR 2 CAPSULES BY MOUTH AT BEDTIME IF NEEDED FOR BETTER SLEEP   • pantoprazole (PROTONIX) 40 MG tablet TAKE 1 TABLET BY MOUTH TWICE DAILY   • topiramate (TOPAMAX) 50 MG tablet TAKE 1 TABLET BY MOUTH TWICE DAILY   • DULoxetine (CYMBALTA) 60 MG capsule TAKE 1 CAPSULE BY MOUTH DAILY   • busPIRone (BUSPAR) 10 MG tablet TAKE 2 TABLETS BY MOUTH TWICE DAILY   • amitriptyline (ELAVIL) 100 MG tablet TAKE 1 TABLET BY MOUTH EVERY NIGHT   • montelukast (SINGULAIR) 10 MG tablet Take 1 tablet by mouth nightly.   • albuterol (PROAIR HFA) 108 (90 Base) MCG/ACT inhaler Inhale 2 puffs into the lungs every 4 hours as needed for Shortness of Breath or Wheezing.   • nystatin (MYCOSTATIN) 963781 UNIT/GM ointment Apply topically 2 times daily.   • zinc methionate 50 MG capsule    • Magnesium 500 MG Cap    • Cholecalciferol (VITAMIN D) 2000 units capsule Take 3 capsules by mouth daily.   • Multiple Vitamins-Minerals (MULTIVITAMIN PO) Take 1 tablet by mouth daily.    • ketoconazole (NIZORAL) 2 % cream At home: Mix  Triamcinolone 0.1% cream 50/50 with ketoconazole 2% cream, then apply twice daily for 2 weeks. Then discontinue Triamcinolone 0.1% cream and use ketoconazole 2% cream twice weekly alone as maintenance.   • triamcinolone (ARISTOCORT) 0.1 % cream At home: Mix Triamcinolone 0.1% cream 50/50 with ketoconazole 2% cream, then apply twice daily for 2 weeks for flares. Then discontinue Triamcinolone 0.1% cream and use ketoconazole 2% cream twice weekly alone as maintenance     No current facility-administered medications for this visit.        PAST MEDICAL HISTORY:      Chronic cholecystitis                                         Biliary dyskinesia                                            Depression                                                    Venous insufficiency                                            Comment: lower extremities    Bronchial spasm                                               Lumbar compression fracture (CMS/HCC)           23 years *      Comment: was treated with a body cast    Edema                                                         Allergy                                                       PONV (postoperative nausea and vomiting)                      Body piercing                                                   Comment: nose and navel piercing    Inflammatory bowel disease                                    Asthma                                                          DERMATOLOGY PAST MEDICAL HISTORY:      Onychomycosis treated with ketoconazole    FAMILY HISTORY:  The patient has a family history of melanoma:  No        SOCIAL HISTORY:   Social History     Tobacco Use   • Smoking status: Never Smoker   • Smokeless tobacco: Never Used   Substance Use Topics   • Alcohol use: Not Currently     Alcohol/week: 0.0 standard drinks     Comment: rare ETOH   • Drug use: No           PHYSICAL EXAMINATION:  The patient is well nourished, well developed, and alert and oriented to person,  place and time with appropriate mood and affect.    Throughout the scalp is superficial bran like scale and erythema consistent with seborrheic dermatitis.    Gluteal crease is not examined today.    Clear today, with only slight circinate pink and red scaly thin plaque(s) consistent with nummular dermatitis located on the right shin.     There does appear to be miniaturized hairs on the frontal/temporal hairline.    Lateral aspects of hands and dorsal 3rd finger over proximal interphalangeal joint there are yellow- skin colored keratotic papules without disruption of dermatoglyphs consistent with Keratoelastoidosis marginalis      The patient was seen and examined by Elisa Lees MD.  in the presence of my medical assistant  Brittny Alcaraz CMA .  This office visit note was in part created by Brittny Alcaraz CMA acting also as a scribe for Elisa Lees MD.   Elisa Lees MD    reviewed the note for accuracy and completeness before signing.      On 8/18/2020, I, Brittny Alcaraz CMA scribed the services personally performed by Elisa Lees MD     The documentation recorded by the scribe accurately and completely reflects the service(s) I personally performed and the decisions made by me.        Home

## 2020-09-23 ENCOUNTER — EMERGENCY (EMERGENCY)
Facility: HOSPITAL | Age: 85
LOS: 1 days | Discharge: HOME | End: 2020-09-23
Attending: EMERGENCY MEDICINE | Admitting: EMERGENCY MEDICINE
Payer: COMMERCIAL

## 2020-09-23 VITALS
HEIGHT: 61 IN | OXYGEN SATURATION: 99 % | DIASTOLIC BLOOD PRESSURE: 89 MMHG | RESPIRATION RATE: 14 BRPM | SYSTOLIC BLOOD PRESSURE: 178 MMHG | HEART RATE: 88 BPM | WEIGHT: 117.95 LBS | TEMPERATURE: 98 F

## 2020-09-23 VITALS
DIASTOLIC BLOOD PRESSURE: 87 MMHG | OXYGEN SATURATION: 99 % | TEMPERATURE: 98 F | SYSTOLIC BLOOD PRESSURE: 165 MMHG | HEART RATE: 85 BPM | RESPIRATION RATE: 14 BRPM

## 2020-09-23 DIAGNOSIS — M25.511 PAIN IN RIGHT SHOULDER: ICD-10-CM

## 2020-09-23 PROBLEM — I48.91 UNSPECIFIED ATRIAL FIBRILLATION: Chronic | Status: ACTIVE | Noted: 2019-01-18

## 2020-09-23 PROBLEM — E11.9 TYPE 2 DIABETES MELLITUS WITHOUT COMPLICATIONS: Chronic | Status: ACTIVE | Noted: 2019-01-18

## 2020-09-23 PROBLEM — I10 ESSENTIAL (PRIMARY) HYPERTENSION: Chronic | Status: ACTIVE | Noted: 2019-01-18

## 2020-09-23 PROBLEM — Z95.0 PRESENCE OF CARDIAC PACEMAKER: Chronic | Status: ACTIVE | Noted: 2019-01-18

## 2020-09-23 PROCEDURE — 73060 X-RAY EXAM OF HUMERUS: CPT

## 2020-09-23 PROCEDURE — 73060 X-RAY EXAM OF HUMERUS: CPT | Mod: 26,RT

## 2020-09-23 PROCEDURE — 73030 X-RAY EXAM OF SHOULDER: CPT

## 2020-09-23 PROCEDURE — 73030 X-RAY EXAM OF SHOULDER: CPT | Mod: 26,RT

## 2020-09-23 PROCEDURE — 99283 EMERGENCY DEPT VISIT LOW MDM: CPT

## 2020-09-23 PROCEDURE — 99284 EMERGENCY DEPT VISIT MOD MDM: CPT | Mod: 25

## 2020-09-23 RX ORDER — CYCLOBENZAPRINE HYDROCHLORIDE 10 MG/1
1 TABLET, FILM COATED ORAL
Qty: 10 | Refills: 0
Start: 2020-09-23 | End: 2020-09-25

## 2020-09-23 RX ORDER — LIDOCAINE 4 G/100G
1 CREAM TOPICAL ONCE
Refills: 0 | Status: COMPLETED | OUTPATIENT
Start: 2020-09-23 | End: 2020-09-23

## 2020-09-23 RX ORDER — TRAMADOL HYDROCHLORIDE 50 MG/1
50 TABLET ORAL ONCE
Refills: 0 | Status: DISCONTINUED | OUTPATIENT
Start: 2020-09-23 | End: 2020-09-23

## 2020-09-23 RX ORDER — TRAMADOL HYDROCHLORIDE 50 MG/1
1 TABLET ORAL
Qty: 12 | Refills: 0
Start: 2020-09-23 | End: 2020-09-25

## 2020-09-23 RX ORDER — KETOROLAC TROMETHAMINE 30 MG/ML
30 SYRINGE (ML) INJECTION ONCE
Refills: 0 | Status: DISCONTINUED | OUTPATIENT
Start: 2020-09-23 | End: 2020-09-23

## 2020-09-23 RX ADMIN — TRAMADOL HYDROCHLORIDE 50 MILLIGRAM(S): 50 TABLET ORAL at 15:15

## 2020-09-23 RX ADMIN — LIDOCAINE 1 PATCH: 4 CREAM TOPICAL at 15:12

## 2020-09-23 RX ADMIN — TRAMADOL HYDROCHLORIDE 50 MILLIGRAM(S): 50 TABLET ORAL at 15:11

## 2020-09-23 RX ADMIN — LIDOCAINE 1 PATCH: 4 CREAM TOPICAL at 14:10

## 2020-09-23 NOTE — ED PROVIDER NOTE - NSFOLLOWUPINSTRUCTIONS_ED_ALL_ED_FT
rest, ice, sling as needed  tramadol for pain, flexeril for muscle spasm. use sparingly as these medications may cause drowsiness and dizziness   SalanPas lidocaine patches over the counter, 12 hours on 12 hours off  follow up with orthopedics if pain continues, referral provided              Shoulder Pain    WHAT YOU NEED TO KNOW:    Shoulder pain is a common problem that can affect your daily activities. Pain can be caused by a problem within your shoulder, such as soreness of a tendon or bursa. A tendon is a cord of tough tissue that connects your muscles to your bones. The bursa is a fluid-filled sac that acts as a cushion between a bone and a tendon. Shoulder pain may also be caused by pain that spreads to your shoulder from another part of your body.    Shoulder Anatomy         DISCHARGE INSTRUCTIONS:    Return to the emergency department if:   •You have severe pain.      •You cannot move your arm or shoulder.      •You have numbness or tingling in your shoulder or arm.      Contact your healthcare provider if:   •Your pain gets worse or does not go away with treatment.      •You have trouble moving your arm or shoulder.      •You have questions or concerns about your condition or care.      Medicines: You may need any of the following:   •Acetaminophen decreases pain and fever. It is available without a doctor's order. Ask how much to take and how often to take it. Follow directions. Read the labels of all other medicines you are using to see if they also contain acetaminophen, or ask your doctor or pharmacist. Acetaminophen can cause liver damage if not taken correctly. Do not use more than 4 grams (4,000 milligrams) total of acetaminophen in one day.       •NSAIDs, such as ibuprofen, help decrease swelling, pain, and fever. This medicine is available with or without a doctor's order. NSAIDs can cause stomach bleeding or kidney problems in certain people. If you take blood thinner medicine, always ask your healthcare provider if NSAIDs are safe for you. Always read the medicine label and follow directions.      •Take your medicine as directed. Contact your healthcare provider if you think your medicine is not helping or if you have side effects. Tell him of her if you are allergic to any medicine. Keep a list of the medicines, vitamins, and herbs you take. Include the amounts, and when and why you take them. Bring the list or the pill bottles to follow-up visits. Carry your medicine list with you in case of an emergency.      Manage your symptoms:   •Apply ice on your shoulder for 20 to 30 minutes every 2 hours or as directed. Use an ice pack, or put crushed ice in a plastic bag. Cover it with a towel before you apply it to your shoulder. Ice helps prevent tissue damage and decreases swelling and pain.      •Apply heat if ice does not help your symptoms. Apply heat on your shoulder for 20 to 30 minutes every 2 hours for as many days as directed. Heat helps decrease pain and muscle spasms.      •Limit activities as directed. Try to avoid repeated overhead movements.      •Go to physical or occupational therapy as directed. A physical therapist teaches you exercises to help improve movement and strength, and to decrease pain. An occupational therapist teaches you skills to help with your daily activities.       Prevent shoulder pain:   •Maintain a good range of motion in your shoulder. Ask your healthcare provider which exercises you should do on a regular basis after you have healed.       •Stretch and strengthen your shoulder. Use proper technique during exercises and sports.      Follow up with your healthcare provider or orthopedist as directed: Write down your questions so you remember to ask them during your visits.

## 2020-09-23 NOTE — ED PROVIDER NOTE - MUSCULOSKELETAL, MLM
tenderness to right shoulder and right upper am. no tenderness to elbow or wrist joints. limited overhead ROM due to pain

## 2020-09-23 NOTE — ED PROVIDER NOTE - NEUROLOGICAL, MLM
Alert and oriented, no focal deficits, no motor or sensory deficits. strong distal pulses. no wrist drop

## 2020-09-23 NOTE — ED PROVIDER NOTE - CLINICAL SUMMARY MEDICAL DECISION MAKING FREE TEXT BOX
right shoulder and upper arm pain. pulled something couple weeks ago. pain went away and returned yesterday. differential include but not limited to fx, dislocation, strain, sprain. will x-ray to eval for fx or dislocation. pain control. ortho follow up. n/v intact

## 2020-09-23 NOTE — ED PROVIDER NOTE - PATIENT PORTAL LINK FT
You can access the FollowMyHealth Patient Portal offered by [facility] by registering at the following website: [email or web address]. By joining [facility]’s FollowMyHealth portal, you will also be able to view your health information using other applications (apps) compatible with our system.

## 2020-09-23 NOTE — ED PROVIDER NOTE - OBJECTIVE STATEMENT
85 year old female with history of HTN and pacemaker presents with pain to right shoulder. patient pulled something for the ground a couple weeks ago. had pain to right shoulder 85 year old female with history of HTN and pacemaker presents with pain to right shoulder. patient pulled something for the ground a couple weeks ago. had pain to right shoulder for about 1-2 days and then went away. pain returned yesterday, worse today. pain same quality and character as previous pain, just worse in intensity. pain 10/10. took tylenol about 1-2 hours ago without improvement. no n/t. no chest pain or shortness of breath. no fevers. worse with movement. denies improving factors   PCP Lupillo Villegas ([place])

## 2020-09-23 NOTE — ED PROVIDER NOTE - PROGRESS NOTE DETAILS
Reevaluated patient at bedside.  Patient feeling improved.  Discussed the results of all diagnostic testing in ED and copies of all reports given.  sling provided by nurse. recommend close follow up with ortho if pain continues. discussed unable to rule out ligamentous injury or occult fx.  An opportunity to ask questions was given.  Discussed the importance of prompt, close medical follow-up.  Patient will return with any changes, concerns or persistent / worsening symptoms.  Understanding of all instructions verbalized. Scribe IN for Dr. Tena: 84 y/o female with PMHx of Afib, Pacemaker, Diabetes, HTN presents to the ED c/o right shoulder pain x 2 weeks. No Hx of recent falls. Pt in no acute distress upon exam. Right shoulder with limited ROM 2/2 pain. upper back spasm, no erythema, no lesion.  X-ray right shoulder no fracture, unremarkable  d/c for ortho f/u.

## 2020-09-23 NOTE — ED PROVIDER NOTE - CARE PROVIDER_API CALL
Luis Langston  ORTHOPAEDIC SURGERY  [address] [place] [address]  Phone: [phone]  Fax: [phone]  Follow Up Time: 1-3 Days

## 2020-09-23 NOTE — ED PROVIDER NOTE - NS_ ATTENDINGSCRIBEDETAILS _ED_A_ED_FT
Sebastian Tena MD - The scribe's documentation has been prepared under my direction and personally reviewed by me in its entirety. I confirm that the note above accurately reflects all work, treatment, procedures, and medical decision making performed by me.

## 2020-09-26 NOTE — ED ADULT NURSE NOTE - OBJECTIVE STATEMENT
Patient requesting Ambien refill after hours.  LMOR - needs office visit.    Pt received alert and oriented x 4, non english speaking, dgthr interpreting to pt. Left nostril nasal packing noted with blood. Dghtr reports left nostril bleeding since yesterday am. 2 visits to [facility] resulted in bleeding being controlled. Bleeding became uncontrollable in past few hours. Dghtr reports recent fall Monday 1/14/19, pt fell and landed on her face. Pt denies any pain or discomfort. Pt is currently taking coumadin. No acute distress. Pt received alert and oriented x 4, non english speaking, dgthr interpreting to pt. Left nostril nasal packing noted with blood. Dghtr reports left nostril bleeding since yesterday am. 2 visits to [facility] resulted in bleeding being controlled. Bleeding became uncontrollable in past few hours. Dghtr reports recent fall Monday 1/14/19, pt fell and landed on her face. Pt denies any pain or discomfort. Pt is currently taking coumadin. No acute distress.  0705 Patient received in bed intubated on vent VS stable but unresponsive to all stimuli. Taken to CT results pending possible transfer to [facility]. Family at bedside. MSpencer Pt received alert and oriented x 4, non english speaking, dgthr interpreting to pt. Left nostril nasal packing noted with blood. Dghtr reports left nostril bleeding since yesterday am. 2 visits to [facility] resulted in bleeding being controlled. Bleeding became uncontrollable in past few hours. Dghtr reports recent fall Monday 1/14/19, pt fell and landed on her face. Pt denies any pain or discomfort. Pt is currently taking coumadin. No acute distress.

## 2021-09-17 NOTE — ED ADULT NURSE NOTE - TOBACCO USE
Check with [facility] if you have had the second dose of Shingrix.     Return if new or worsening symptoms.     
Never smoker

## 2023-03-14 NOTE — ED PROVIDER NOTE - NS ED MD DISPO DISCHARGE CCDA
[FreeTextEntry1] :  s/p T1 to L4 spinal fusion with closure DOS: 2/15/23. Patient doing well, he denies any complaints of pain, bleeding, itching, drainage, redness, fever, or chills.
Patient/Caregiver provided printed discharge information.

## 2023-04-07 NOTE — ED ADULT TRIAGE NOTE - INTERPRETATION SERVICES DECLINED
04/07/2023  Shubham Douglass is a 32 y.o., male.      Pre-op Assessment    I have reviewed the Patient Summary Reports.     I have reviewed the Nursing Notes. I have reviewed the NPO Status.   I have reviewed the Medications.     Review of Systems  Anesthesia Hx:  No problems with previous Anesthesia    Social:  No Alcohol Use, Smoker    Hematology/Oncology:  Hematology Normal   Oncology Normal     EENT/Dental:EENT/Dental Normal   Cardiovascular:  Cardiovascular Normal     Pulmonary:  Pulmonary Normal    Renal/:  Renal/ Normal     Hepatic/GI:  Hepatic/GI Normal    Musculoskeletal:  Musculoskeletal Normal    Neurological:  Neurology Normal    Endocrine:  Endocrine Normal    Dermatological:  Skin Normal    Psych:  Psychiatric Normal           Physical Exam  General: Well nourished, Cooperative, Alert and Oriented    Airway:  Mallampati: II   Mouth Opening: Normal  TM Distance: Normal  Tongue: Normal  Neck ROM: Normal ROM    Dental:  Intact    Chest/Lungs:  Clear to auscultation, Normal Respiratory Rate    Heart:  Rate: Normal  Rhythm: Regular Rhythm  Sounds: Normal    Abdomen:  Normal, Soft, Nontender        Anesthesia Plan  Type of Anesthesia, risks & benefits discussed:    Anesthesia Type: Gen Natural Airway, MAC  Intra-op Monitoring Plan: Standard ASA Monitors  Induction:  IV  Informed Consent: Informed consent signed with the Patient and all parties understand the risks and agree with anesthesia plan.  All questions answered.   ASA Score: 2  Day of Surgery Review of History & Physical: I have interviewed and examined the patient. I have reviewed the patient's H&P dated:     Ready For Surgery From Anesthesia Perspective.     .  Previously healthy  Recent h/o abdominal pain, nausea, constipation and weight loss    
Patient/Caregiver requests family/friend to interpret.

## 2023-05-08 ENCOUNTER — APPOINTMENT (OUTPATIENT)
Dept: OTOLARYNGOLOGY | Facility: CLINIC | Age: 88
End: 2023-05-08
Payer: MEDICARE

## 2023-05-08 VITALS
DIASTOLIC BLOOD PRESSURE: 83 MMHG | SYSTOLIC BLOOD PRESSURE: 169 MMHG | HEART RATE: 61 BPM | BODY MASS INDEX: 22.45 KG/M2 | RESPIRATION RATE: 16 BRPM | OXYGEN SATURATION: 95 % | TEMPERATURE: 97.1 F | WEIGHT: 122 LBS | HEIGHT: 62 IN

## 2023-05-08 DIAGNOSIS — Z82.3 FAMILY HISTORY OF STROKE: ICD-10-CM

## 2023-05-08 DIAGNOSIS — Z95.0 PRESENCE OF CARDIAC PACEMAKER: ICD-10-CM

## 2023-05-08 DIAGNOSIS — Z86.79 PERSONAL HISTORY OF OTHER DISEASES OF THE CIRCULATORY SYSTEM: ICD-10-CM

## 2023-05-08 DIAGNOSIS — Z83.49 FAMILY HISTORY OF OTHER ENDOCRINE, NUTRITIONAL AND METABOLIC DISEASES: ICD-10-CM

## 2023-05-08 DIAGNOSIS — Z82.49 FAMILY HISTORY OF ISCHEMIC HEART DISEASE AND OTHER DISEASES OF THE CIRCULATORY SYSTEM: ICD-10-CM

## 2023-05-08 PROCEDURE — 31575 DIAGNOSTIC LARYNGOSCOPY: CPT

## 2023-05-08 PROCEDURE — 99204 OFFICE O/P NEW MOD 45 MIN: CPT | Mod: 25

## 2023-05-08 RX ORDER — METOPROLOL SUCCINATE 25 MG/1
25 TABLET, EXTENDED RELEASE ORAL
Refills: 0 | Status: ACTIVE | COMMUNITY

## 2023-05-08 RX ORDER — AMLODIPINE BESYLATE 5 MG/1
TABLET ORAL
Refills: 0 | Status: ACTIVE | COMMUNITY

## 2023-05-08 RX ORDER — RIVAROXABAN 2.5 MG/1
TABLET, FILM COATED ORAL
Refills: 0 | Status: ACTIVE | COMMUNITY

## 2023-05-08 NOTE — HISTORY OF PRESENT ILLNESS
[de-identified] : This is a patient referred by Erica Mcgarry/Dr. Tyson for left thyroid nodule. This was found years ago during physical exam/US. Pt had fna of the right thyroid nodule negativein 2008  and did not f/u since 2008. PT was then told by cardio to f/u on  goiter. PT went to see her endo armond Cobos done in office and then send for  FNA( need office report) and thyroseq positive. \par No dysphagia, dysphonia or dyspnea.\par Patient denies h/o radiation and pt sister has thyroid nodule in [place] recently had s/p surgery negative for thyroid cancer\par

## 2023-05-08 NOTE — PHYSICAL EXAM
[de-identified] : Enlarged thyroid gland with firm nodule along the left superior pole.  No LAD. [Midline] : trachea located in midline position [Laryngoscopy Performed] : laryngoscopy was performed, see procedure section for findings [Normal] : no rashes

## 2023-05-08 NOTE — DATA REVIEWED
[de-identified] : US with solid nodule along the L. superior pole.  There is a spongiform nodule in the right thyroid lobe with other smaller cystic nodules.  No LAD. 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

## 2023-05-08 NOTE — CONSULT LETTER
[Dear  ___] : Dear  [unfilled], [Consult Letter:] : I had the pleasure of evaluating your patient, [unfilled]. [Please see my note below.] : Please see my note below. [Consult Closing:] : Thank you very much for allowing me to participate in the care of this patient.  If you have any questions, please do not hesitate to contact me. [Sincerely,] : Sincerely, [FreeTextEntry2] : Dr. Priyanka Pardo\par [address] [facility] [address], \[place] [address] [FreeTextEntry3] : Dev\par \par Haider Wise MD FACS\par Division of Head and Neck Surgery\par Department of Otolaryngology \par [facility]\par \par  of Otolaryngology\par Assistant Professor of Surgery\par [employer] [facility]

## 2023-05-25 ENCOUNTER — OUTPATIENT (OUTPATIENT)
Dept: OUTPATIENT SERVICES | Facility: HOSPITAL | Age: 88
LOS: 1 days | End: 2023-05-25
Payer: MEDICARE

## 2023-05-25 VITALS
WEIGHT: 123.9 LBS | OXYGEN SATURATION: 97 % | HEIGHT: 60 IN | SYSTOLIC BLOOD PRESSURE: 164 MMHG | DIASTOLIC BLOOD PRESSURE: 94 MMHG | TEMPERATURE: 98 F | HEART RATE: 78 BPM

## 2023-05-25 DIAGNOSIS — E04.2 NONTOXIC MULTINODULAR GOITER: ICD-10-CM

## 2023-05-25 DIAGNOSIS — R03.0 ELEVATED BLOOD-PRESSURE READING, WITHOUT DIAGNOSIS OF HYPERTENSION: ICD-10-CM

## 2023-05-25 DIAGNOSIS — Z95.0 PRESENCE OF CARDIAC PACEMAKER: ICD-10-CM

## 2023-05-25 DIAGNOSIS — I48.91 UNSPECIFIED ATRIAL FIBRILLATION: ICD-10-CM

## 2023-05-25 DIAGNOSIS — I10 ESSENTIAL (PRIMARY) HYPERTENSION: ICD-10-CM

## 2023-05-25 DIAGNOSIS — D72.819 DECREASED WHITE BLOOD CELL COUNT, UNSPECIFIED: ICD-10-CM

## 2023-05-25 LAB
ALBUMIN SERPL ELPH-MCNC: 3.9 G/DL — SIGNIFICANT CHANGE UP (ref 3.3–5)
ALP SERPL-CCNC: 92 U/L — SIGNIFICANT CHANGE UP (ref 40–120)
ALT FLD-CCNC: 7 U/L — SIGNIFICANT CHANGE UP (ref 4–33)
ANION GAP SERPL CALC-SCNC: 8 MMOL/L — SIGNIFICANT CHANGE UP (ref 7–14)
AST SERPL-CCNC: 22 U/L — SIGNIFICANT CHANGE UP (ref 4–32)
BILIRUB SERPL-MCNC: 0.5 MG/DL — SIGNIFICANT CHANGE UP (ref 0.2–1.2)
BLD GP AB SCN SERPL QL: NEGATIVE — SIGNIFICANT CHANGE UP
BUN SERPL-MCNC: 18 MG/DL — SIGNIFICANT CHANGE UP (ref 7–23)
CALCIUM SERPL-MCNC: 9.5 MG/DL — SIGNIFICANT CHANGE UP (ref 8.4–10.5)
CHLORIDE SERPL-SCNC: 98 MMOL/L — SIGNIFICANT CHANGE UP (ref 98–107)
CO2 SERPL-SCNC: 29 MMOL/L — SIGNIFICANT CHANGE UP (ref 22–31)
CREAT SERPL-MCNC: 0.92 MG/DL — SIGNIFICANT CHANGE UP (ref 0.5–1.3)
EGFR: 61 ML/MIN/1.73M2 — SIGNIFICANT CHANGE UP
GLUCOSE SERPL-MCNC: 83 MG/DL — SIGNIFICANT CHANGE UP (ref 70–99)
HCT VFR BLD CALC: 37.1 % — SIGNIFICANT CHANGE UP (ref 34.5–45)
HGB BLD-MCNC: 12.5 G/DL — SIGNIFICANT CHANGE UP (ref 11.5–15.5)
MCHC RBC-ENTMCNC: 32.8 PG — SIGNIFICANT CHANGE UP (ref 27–34)
MCHC RBC-ENTMCNC: 33.7 GM/DL — SIGNIFICANT CHANGE UP (ref 32–36)
MCV RBC AUTO: 97.4 FL — SIGNIFICANT CHANGE UP (ref 80–100)
NRBC # BLD: 0 /100 WBCS — SIGNIFICANT CHANGE UP (ref 0–0)
NRBC # FLD: 0 K/UL — SIGNIFICANT CHANGE UP (ref 0–0)
PLATELET # BLD AUTO: 120 K/UL — LOW (ref 150–400)
POTASSIUM SERPL-MCNC: 3.7 MMOL/L — SIGNIFICANT CHANGE UP (ref 3.5–5.3)
POTASSIUM SERPL-SCNC: 3.7 MMOL/L — SIGNIFICANT CHANGE UP (ref 3.5–5.3)
PROT SERPL-MCNC: 6.1 G/DL — SIGNIFICANT CHANGE UP (ref 6–8.3)
RBC # BLD: 3.81 M/UL — SIGNIFICANT CHANGE UP (ref 3.8–5.2)
RBC # FLD: 13.1 % — SIGNIFICANT CHANGE UP (ref 10.3–14.5)
RH IG SCN BLD-IMP: POSITIVE — SIGNIFICANT CHANGE UP
SODIUM SERPL-SCNC: 135 MMOL/L — SIGNIFICANT CHANGE UP (ref 135–145)
WBC # BLD: 3.13 K/UL — LOW (ref 3.8–10.5)
WBC # FLD AUTO: 3.13 K/UL — LOW (ref 3.8–10.5)

## 2023-05-25 PROCEDURE — 93010 ELECTROCARDIOGRAM REPORT: CPT

## 2023-05-25 RX ORDER — WARFARIN SODIUM 2.5 MG/1
1 TABLET ORAL
Qty: 0 | Refills: 0 | DISCHARGE

## 2023-05-25 RX ORDER — LOSARTAN POTASSIUM 100 MG/1
1 TABLET, FILM COATED ORAL
Qty: 0 | Refills: 0 | DISCHARGE

## 2023-05-25 RX ORDER — SODIUM CHLORIDE 9 MG/ML
1000 INJECTION, SOLUTION INTRAVENOUS
Refills: 0 | Status: DISCONTINUED | OUTPATIENT
Start: 2023-05-31 | End: 2023-06-14

## 2023-05-25 RX ORDER — METOPROLOL TARTRATE 50 MG
1 TABLET ORAL
Qty: 0 | Refills: 0 | DISCHARGE

## 2023-05-25 RX ORDER — ALENDRONATE SODIUM 70 MG/1
1 TABLET ORAL
Qty: 0 | Refills: 0 | DISCHARGE

## 2023-05-25 RX ORDER — FENOFIBRATE,MICRONIZED 130 MG
1 CAPSULE ORAL
Qty: 0 | Refills: 0 | DISCHARGE

## 2023-05-25 NOTE — H&P PST ADULT - CARDIOVASCULAR COMMENTS
connie of aftyrone - mack , has PPM left chest wall pacemaker - site benign hx of afib - ON xarelto , has PPM

## 2023-05-25 NOTE — H&P PST ADULT - PROBLEM/PLAN-2
----- Message from Varsha Boudreaux sent at 1/10/2017  9:41 AM CST -----  Contact: pt [phone]  Call placed to pod patient is returning your call back.    
Left message for pt to call office. His semen specimen was reviewed by dr chaney here in the office under the microscope and no semen were seen.  
Spoke to pt and informed him of the test results. Pt verbalized understanding.  
DISPLAY PLAN FREE TEXT

## 2023-05-25 NOTE — H&P PST ADULT - PROBLEM SELECTOR PLAN 6
Pt has left chest wall Cardiac pacemaker.  OR booking notified .  Copy of pacemaker card in chart.    Brand - [employer]  Implant date 22 -Aug - 2018  Serial number [id number]  Model number [id number]

## 2023-05-25 NOTE — H&P PST ADULT - HISTORY OF PRESENT ILLNESS
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

## 2023-05-25 NOTE — H&P PST ADULT - MUSCULOSKELETAL
negative ROM intact/normal gait/strength 5/5 bilateral upper extremities/strength 5/5 bilateral lower extremities/extremities exam details… ROM intact/no calf tenderness/normal gait/strength 5/5 bilateral upper extremities/strength 5/5 bilateral lower extremities/extremities exam

## 2023-05-25 NOTE — H&P PST ADULT - FUNCTIONAL STATUS
Met score - 4 Walks 1 to 2 blocks, climbs 1 flight of stairs, ADLs  , does  exercise eliptical 30 mts every day

## 2023-05-25 NOTE — H&P PST ADULT - OTHER CARE PROVIDERS
Dr Gómez [phone] cardiologist , Dr Darin Rivera [phone]  - hematologist , Dr Nanda Angeles [phone]- endocrinologist

## 2023-05-25 NOTE — H&P PST ADULT - PROBLEM SELECTOR PLAN 4
Pt had elevated blood pressure reading at PST today.  Requesting cardiology evaluation  Pt already had cardiology appointment Pt had elevated blood pressure reading at PST today.  Requesting cardiology evaluation ,echo and stress reports.  Requesting EKG for comparison.  Pt already had cardiology appointment

## 2023-05-25 NOTE — H&P PST ADULT - NSICDXPASTMEDICALHX_GEN_ALL_CORE_FT
PAST MEDICAL HISTORY:  Atrial fibrillation     Decreased white blood cell count     Diabetes on insulin    Hypertension     Nontoxic multinodular goiter     Pacemaker

## 2023-05-25 NOTE — H&P PST ADULT - PROBLEM SELECTOR PLAN 2
Patient instructed to take metoprolol , olmesartan- amlodipine- hctz with a sip of water on the morning of procedure.

## 2023-05-25 NOTE — H&P PST ADULT - PROBLEM SELECTOR PLAN 1
Patient tentatively scheduled for left hemithyroidectomy , possible total thyroidectomy with central neck dissection on   on 05/31/2023  Pre-op instructions provided. Pt given verbal and written instructions with teach back on chlorhexidine wash  and pepcid. Pt verbalized understanding with return demonstration.    Labs done. Patient tentatively scheduled for left hemithyroidectomy , possible total thyroidectomy with central neck dissection on 05/31/2023    Pre-op instructions provided. Pt given verbal and written instructions with teach back on chlorhexidine wash  and pepcid. Pt verbalized understanding with return demonstration.    Labs done.

## 2023-05-25 NOTE — H&P PST ADULT - PROBLEM SELECTOR PLAN 5
Requesting copy of cardiology evaluation.  Pt already had hematologist  appointment Requesting copy of hematology evaluation.  Pt already had hematologist  appointment

## 2023-05-25 NOTE — H&P PST ADULT - ENMT COMMENTS
hx of non toxic multinodular goiter Nontoxic multinodular goiter Preop dx- Nontoxic multinodular goiter

## 2023-05-30 ENCOUNTER — TRANSCRIPTION ENCOUNTER (OUTPATIENT)
Age: 88
End: 2023-05-30

## 2023-05-30 NOTE — ASU PATIENT PROFILE, ADULT - TELEPHONIC ID NUMBER OF THE INTERPRETER
Health Maintenance Due   Topic Date Due   • Diabetes Foot Exam  08/12/1971   • Shingles Vaccine (2 of 3) 01/04/2017   • Abdominal Aortic Aneurysm (AAA) Screening  08/12/2019   • Pneumococcal Vaccine 65+ (1 of 2 - PCV13) 08/12/2019   • Influenza Vaccine (1) 09/01/2019   • Diabetes Eye Exam  02/13/2020       Patient is due for topics listed above, he wishes to proceed with Immunization(s) Influenza, Diabetes Eye Exam and Diabetes Foot Exam, but is not proceeding with Immunization(s) Pneumococcal and Shingles at this time. - ER         [id number]

## 2023-05-31 ENCOUNTER — OUTPATIENT (OUTPATIENT)
Dept: OUTPATIENT SERVICES | Facility: HOSPITAL | Age: 88
LOS: 1 days | Discharge: HOME | End: 2023-05-31
Payer: MEDICARE

## 2023-05-31 ENCOUNTER — APPOINTMENT (OUTPATIENT)
Dept: OTOLARYNGOLOGY | Facility: HOSPITAL | Age: 88
End: 2023-05-31

## 2023-05-31 ENCOUNTER — RESULT REVIEW (OUTPATIENT)
Age: 88
End: 2023-05-31

## 2023-05-31 ENCOUNTER — TRANSCRIPTION ENCOUNTER (OUTPATIENT)
Age: 88
End: 2023-05-31

## 2023-05-31 VITALS
SYSTOLIC BLOOD PRESSURE: 134 MMHG | DIASTOLIC BLOOD PRESSURE: 84 MMHG | HEART RATE: 61 BPM | RESPIRATION RATE: 18 BRPM | OXYGEN SATURATION: 99 %

## 2023-05-31 VITALS
TEMPERATURE: 98 F | HEART RATE: 87 BPM | HEIGHT: 60 IN | DIASTOLIC BLOOD PRESSURE: 88 MMHG | OXYGEN SATURATION: 98 % | WEIGHT: 123.9 LBS | SYSTOLIC BLOOD PRESSURE: 159 MMHG | RESPIRATION RATE: 16 BRPM

## 2023-05-31 DIAGNOSIS — E04.2 NONTOXIC MULTINODULAR GOITER: ICD-10-CM

## 2023-05-31 LAB — GLUCOSE BLDC GLUCOMTR-MCNC: 89 MG/DL — SIGNIFICANT CHANGE UP (ref 70–99)

## 2023-05-31 PROCEDURE — 88307 TISSUE EXAM BY PATHOLOGIST: CPT | Mod: 26

## 2023-05-31 PROCEDURE — 88331 PATH CONSLTJ SURG 1 BLK 1SPC: CPT | Mod: 26

## 2023-05-31 PROCEDURE — 60271 REMOVAL OF THYROID: CPT | Mod: GC

## 2023-05-31 PROCEDURE — 88305 TISSUE EXAM BY PATHOLOGIST: CPT | Mod: 26

## 2023-05-31 DEVICE — LIGATING CLIPS WECK HORIZON SMALL-WIDE (RED) 24: Type: IMPLANTABLE DEVICE | Status: FUNCTIONAL

## 2023-05-31 DEVICE — SURGIFOAM PAD 8CM X 12.5CM X 2MM (100C): Type: IMPLANTABLE DEVICE | Status: FUNCTIONAL

## 2023-05-31 DEVICE — LIGATING CLIPS WECK HORIZON MEDIUM (BLUE) 24: Type: IMPLANTABLE DEVICE | Status: FUNCTIONAL

## 2023-05-31 DEVICE — SURGIFLO MATRIX WITH THROMBIN KIT: Type: IMPLANTABLE DEVICE | Status: FUNCTIONAL

## 2023-05-31 RX ORDER — FENTANYL CITRATE 50 UG/ML
25 INJECTION INTRAVENOUS
Refills: 0 | Status: DISCONTINUED | OUTPATIENT
Start: 2023-05-31 | End: 2023-05-31

## 2023-05-31 RX ORDER — FENTANYL CITRATE 50 UG/ML
50 INJECTION INTRAVENOUS
Refills: 0 | Status: DISCONTINUED | OUTPATIENT
Start: 2023-05-31 | End: 2023-05-31

## 2023-05-31 RX ORDER — ONDANSETRON 8 MG/1
4 TABLET, FILM COATED ORAL ONCE
Refills: 0 | Status: DISCONTINUED | OUTPATIENT
Start: 2023-05-31 | End: 2023-06-14

## 2023-05-31 RX ADMIN — FENTANYL CITRATE 25 MICROGRAM(S): 50 INJECTION INTRAVENOUS at 15:39

## 2023-05-31 RX ADMIN — FENTANYL CITRATE 25 MICROGRAM(S): 50 INJECTION INTRAVENOUS at 15:45

## 2023-05-31 NOTE — ASU PREOP CHECKLIST - PATIENT'S PERSONAL PROPERTY GIVEN TO
Shannan Rn with Dr. Saleh office called this afternoon Wednesday 7/3/19 and is requesting a call back at [phone] in regards to needing the patient to be seen here asap call routed for Dr. Gloria staff then documented the call 7/3/19.   ASU #14/on unit

## 2023-05-31 NOTE — ASU DISCHARGE PLAN (ADULT/PEDIATRIC) - ASU DC SPECIAL INSTRUCTIONSFT
you can take extra strength tylenol and ibuprofen for pain control. 1000mg of tylenol and 400mg of ibuprofen alternating every 3 hours between the two. (ie tylenol at 12, ibuprofen at 3, tylenol at 6). You may do this as needed for post operative pain.    Follow up with Dr. Wise as instructed by the office

## 2023-05-31 NOTE — ASU DISCHARGE PLAN (ADULT/PEDIATRIC) - NS MD DC FALL RISK RISK
For information on Fall & Injury Prevention, visit: [email or web address] OR  [email or web address] OR  https://www.cdc.gov/steadi/patient.html

## 2023-05-31 NOTE — ASU PREOP CHECKLIST - SELECT TESTS ORDERED
Patient requested a refill on her pain medication. Patient confirmed pharmacy on file.  [facility] Type and Cross/POCT Blood Glucose

## 2023-06-04 ENCOUNTER — EMERGENCY (EMERGENCY)
Facility: HOSPITAL | Age: 88
LOS: 1 days | Discharge: ANOTHER HOSPITAL | End: 2023-06-04
Attending: EMERGENCY MEDICINE | Admitting: EMERGENCY MEDICINE
Payer: MEDICARE

## 2023-06-04 ENCOUNTER — INPATIENT (INPATIENT)
Facility: HOSPITAL | Age: 88
LOS: 1 days | Discharge: HOME | End: 2023-06-06
Attending: OTOLARYNGOLOGY | Admitting: OTOLARYNGOLOGY
Payer: MEDICARE

## 2023-06-04 VITALS
OXYGEN SATURATION: 97 % | TEMPERATURE: 98 F | RESPIRATION RATE: 18 BRPM | HEART RATE: 65 BPM | DIASTOLIC BLOOD PRESSURE: 89 MMHG | SYSTOLIC BLOOD PRESSURE: 188 MMHG

## 2023-06-04 VITALS
DIASTOLIC BLOOD PRESSURE: 66 MMHG | TEMPERATURE: 99 F | OXYGEN SATURATION: 95 % | RESPIRATION RATE: 17 BRPM | SYSTOLIC BLOOD PRESSURE: 155 MMHG | HEIGHT: 60 IN | HEART RATE: 88 BPM

## 2023-06-04 VITALS
TEMPERATURE: 98 F | HEART RATE: 65 BPM | DIASTOLIC BLOOD PRESSURE: 92 MMHG | OXYGEN SATURATION: 98 % | SYSTOLIC BLOOD PRESSURE: 179 MMHG | HEIGHT: 60 IN | WEIGHT: 112.44 LBS | RESPIRATION RATE: 18 BRPM

## 2023-06-04 LAB
ALBUMIN SERPL ELPH-MCNC: 3.3 G/DL — SIGNIFICANT CHANGE UP (ref 3.3–5)
ALP SERPL-CCNC: 91 U/L — SIGNIFICANT CHANGE UP (ref 30–120)
ALT FLD-CCNC: 19 U/L DA — SIGNIFICANT CHANGE UP (ref 10–60)
ANION GAP SERPL CALC-SCNC: 7 MMOL/L — SIGNIFICANT CHANGE UP (ref 5–17)
APTT BLD: 36.5 SEC — HIGH (ref 27.5–35.5)
AST SERPL-CCNC: 33 U/L — SIGNIFICANT CHANGE UP (ref 10–40)
BASOPHILS # BLD AUTO: 0.07 K/UL — SIGNIFICANT CHANGE UP (ref 0–0.2)
BASOPHILS NFR BLD AUTO: 1.8 % — SIGNIFICANT CHANGE UP (ref 0–2)
BILIRUB SERPL-MCNC: 0.7 MG/DL — SIGNIFICANT CHANGE UP (ref 0.2–1.2)
BUN SERPL-MCNC: 21 MG/DL — SIGNIFICANT CHANGE UP (ref 7–23)
CALCIUM SERPL-MCNC: 8.7 MG/DL — SIGNIFICANT CHANGE UP (ref 8.4–10.5)
CHLORIDE SERPL-SCNC: 95 MMOL/L — LOW (ref 96–108)
CO2 SERPL-SCNC: 28 MMOL/L — SIGNIFICANT CHANGE UP (ref 22–31)
CREAT SERPL-MCNC: 0.85 MG/DL — SIGNIFICANT CHANGE UP (ref 0.5–1.3)
EGFR: 67 ML/MIN/1.73M2 — SIGNIFICANT CHANGE UP
EOSINOPHIL # BLD AUTO: 0.24 K/UL — SIGNIFICANT CHANGE UP (ref 0–0.5)
EOSINOPHIL NFR BLD AUTO: 6.3 % — HIGH (ref 0–6)
GLUCOSE SERPL-MCNC: 126 MG/DL — HIGH (ref 70–99)
HCT VFR BLD CALC: 34.3 % — LOW (ref 34.5–45)
HGB BLD-MCNC: 11.9 G/DL — SIGNIFICANT CHANGE UP (ref 11.5–15.5)
IMM GRANULOCYTES NFR BLD AUTO: 0.3 % — SIGNIFICANT CHANGE UP (ref 0–0.9)
INR BLD: 1.56 RATIO — HIGH (ref 0.88–1.16)
LYMPHOCYTES # BLD AUTO: 1.21 K/UL — SIGNIFICANT CHANGE UP (ref 1–3.3)
LYMPHOCYTES # BLD AUTO: 31.7 % — SIGNIFICANT CHANGE UP (ref 13–44)
MCHC RBC-ENTMCNC: 33.1 PG — SIGNIFICANT CHANGE UP (ref 27–34)
MCHC RBC-ENTMCNC: 34.7 GM/DL — SIGNIFICANT CHANGE UP (ref 32–36)
MCV RBC AUTO: 95.3 FL — SIGNIFICANT CHANGE UP (ref 80–100)
MONOCYTES # BLD AUTO: 0.47 K/UL — SIGNIFICANT CHANGE UP (ref 0–0.9)
MONOCYTES NFR BLD AUTO: 12.3 % — SIGNIFICANT CHANGE UP (ref 2–14)
NEUTROPHILS # BLD AUTO: 1.82 K/UL — SIGNIFICANT CHANGE UP (ref 1.8–7.4)
NEUTROPHILS NFR BLD AUTO: 47.6 % — SIGNIFICANT CHANGE UP (ref 43–77)
NRBC # BLD: 0 /100 WBCS — SIGNIFICANT CHANGE UP (ref 0–0)
PLATELET # BLD AUTO: 135 K/UL — LOW (ref 150–400)
POTASSIUM SERPL-MCNC: 4.1 MMOL/L — SIGNIFICANT CHANGE UP (ref 3.5–5.3)
POTASSIUM SERPL-SCNC: 4.1 MMOL/L — SIGNIFICANT CHANGE UP (ref 3.5–5.3)
PROT SERPL-MCNC: 6.1 G/DL — SIGNIFICANT CHANGE UP (ref 6–8.3)
PROTHROM AB SERPL-ACNC: 18.5 SEC — HIGH (ref 10.5–13.4)
RBC # BLD: 3.6 M/UL — LOW (ref 3.8–5.2)
RBC # FLD: 12.8 % — SIGNIFICANT CHANGE UP (ref 10.3–14.5)
SODIUM SERPL-SCNC: 130 MMOL/L — LOW (ref 135–145)
WBC # BLD: 3.82 K/UL — SIGNIFICANT CHANGE UP (ref 3.8–10.5)
WBC # FLD AUTO: 3.82 K/UL — SIGNIFICANT CHANGE UP (ref 3.8–10.5)

## 2023-06-04 PROCEDURE — 99285 EMERGENCY DEPT VISIT HI MDM: CPT | Mod: 25

## 2023-06-04 PROCEDURE — 80053 COMPREHEN METABOLIC PANEL: CPT

## 2023-06-04 PROCEDURE — 85730 THROMBOPLASTIN TIME PARTIAL: CPT

## 2023-06-04 PROCEDURE — 85025 COMPLETE CBC W/AUTO DIFF WBC: CPT

## 2023-06-04 PROCEDURE — 99285 EMERGENCY DEPT VISIT HI MDM: CPT

## 2023-06-04 PROCEDURE — 70491 CT SOFT TISSUE NECK W/DYE: CPT | Mod: MA

## 2023-06-04 PROCEDURE — 36415 COLL VENOUS BLD VENIPUNCTURE: CPT

## 2023-06-04 PROCEDURE — 85610 PROTHROMBIN TIME: CPT

## 2023-06-04 PROCEDURE — 70491 CT SOFT TISSUE NECK W/DYE: CPT | Mod: 26,MA

## 2023-06-04 RX ORDER — METOPROLOL TARTRATE 50 MG
1 TABLET ORAL
Refills: 0 | DISCHARGE

## 2023-06-04 RX ORDER — ZOLPIDEM TARTRATE 10 MG/1
1 TABLET ORAL
Refills: 0 | DISCHARGE

## 2023-06-04 RX ORDER — QUETIAPINE FUMARATE 200 MG/1
1 TABLET, FILM COATED ORAL
Refills: 0 | DISCHARGE

## 2023-06-04 RX ORDER — OXCARBAZEPINE 300 MG/1
2 TABLET, FILM COATED ORAL
Refills: 0 | DISCHARGE

## 2023-06-04 RX ORDER — RIVAROXABAN 15 MG-20MG
1 KIT ORAL
Refills: 0 | DISCHARGE

## 2023-06-04 RX ORDER — ACETAMINOPHEN 500 MG
1000 TABLET ORAL ONCE
Refills: 0 | Status: COMPLETED | OUTPATIENT
Start: 2023-06-04 | End: 2023-06-04

## 2023-06-04 RX ORDER — SODIUM CHLORIDE 9 MG/ML
1000 INJECTION INTRAMUSCULAR; INTRAVENOUS; SUBCUTANEOUS ONCE
Refills: 0 | Status: COMPLETED | OUTPATIENT
Start: 2023-06-04 | End: 2023-06-04

## 2023-06-04 RX ORDER — FOLIC ACID 0.8 MG
0 TABLET ORAL
Refills: 0 | DISCHARGE

## 2023-06-04 RX ORDER — PREGABALIN 225 MG/1
0 CAPSULE ORAL
Refills: 0 | DISCHARGE

## 2023-06-04 RX ORDER — ALENDRONATE SODIUM 70 MG/1
1 TABLET ORAL
Refills: 0 | DISCHARGE

## 2023-06-04 RX ADMIN — SODIUM CHLORIDE 1000 MILLILITER(S): 9 INJECTION INTRAMUSCULAR; INTRAVENOUS; SUBCUTANEOUS at 23:23

## 2023-06-04 RX ADMIN — Medication 400 MILLIGRAM(S): at 23:23

## 2023-06-04 NOTE — ED ADULT TRIAGE NOTE - CHIEF COMPLAINT QUOTE
" she had thyroid surgery done at [place] last week ( May 31, 2023 by Dr Wise) , after surgery everything was fine , started yesterday her neck is turning black and blue and swollen a lot " Pt denies any SOB vomiting, nausea, , diarrhea , fever or chills.

## 2023-06-04 NOTE — ED ADULT NURSE NOTE - NSFALLRISKINTERV_ED_ALL_ED
Assistance OOB with selected safe patient handling equipment if applicable/Assistance with ambulation/Communicate fall risk and risk factors to all staff, patient, and family/Encourage patient to sit up slowly, dangle for a short time, stand at bedside before walking/Monitor gait and stability/Orthostatic vital signs/Provide patient with walking aids/Provide visual cue: yellow wristband, yellow gown, etc/Reinforce activity limits and safety measures with patient and family/Call bell, personal items and telephone in reach/Instruct patient to call for assistance before getting out of bed/chair/stretcher/Non-slip footwear applied when patient is off stretcher/[place] to call system/Physically safe environment - no spills, clutter or unnecessary equipment/Purposeful Proactive Rounding/Room/bathroom lighting operational, light cord in reach

## 2023-06-04 NOTE — ED PROVIDER NOTE - CLINICAL SUMMARY MEDICAL DECISION MAKING FREE TEXT BOX
Aftab Mendes MD (PGY-3): The patient is a 87y Female with pmhx of afib on Xarelto, DM II, HTN, is POD #4 from L hemithyroidectomy for possible cancerous mass p/w neck swelling/redness. CT neck done at [place] shows a neck hematoma. No clinical signs of airway compromise. IVF, pain control, ENT consult. Dispo pending ENT's final recs.

## 2023-06-04 NOTE — ED ADULT NURSE NOTE - CHIEF COMPLAINT QUOTE
" she had thyroid surgery done at [place] last week ( May 31, 2023 by Dr Wise) , after surgery everything was fine , started yesterday her neck is turning black and blue and swollen a lot " Pt denies any SOB vomiting, nausea, , diarrhea , fever or chills.

## 2023-06-04 NOTE — H&P ADULT - ASSESSMENT
A/P: 87-year-old female with history of atrial fib on Xarelto POD4 from L hemithyroidectomy now p/w progressive neck hematoma since surgery. No active bleeding or respiratory issues.     - Overnight observation  - NPO at midnight  - IV unasyn  - D/w attending Dr. Wise

## 2023-06-04 NOTE — ED ADULT NURSE NOTE - NSFALLRISKFACTORS_ED_ALL_ED
Age: 85 years old or older/Surgery: Recent surgery, recent lower limb amputation, major abdominal or thoracic surgery

## 2023-06-04 NOTE — ED PROVIDER NOTE - CLINICAL SUMMARY MEDICAL DECISION MAKING FREE TEXT BOX
87-year-old female with history of atrial fib on Xarelto had thyroidectomy at [facility] ambulatory surgery last week for possible cancer was told to discontinue Xarelto until after surgery which she then resumed.  Noted large amount of bruising swelling and bleeding from neck wound yesterday.  Denies difficulty breathing or swallowing.  Patient's daughter providing interpretation states she did not call surgeon but brought patient here for evaluation.  ENT surgeon was Dr. Wise patient's PCP and cardiologist are in [place] Dr. Villegas    Physical exam with swelling and bruising around surgical site could be usual postoperative bruising versus hematoma.  Plan is we will get labs and CT neck.  We will contact her ENT surgeon for guidance.

## 2023-06-04 NOTE — ED ADULT NURSE NOTE - NURSING ED SKIN COLOR
Patient was called.  She cannot make it in for a follow-up today.  She said she wants her back done again.  Writer will change her 10/10/17 injection back to the facet and a cervical injection is scheduled now for 11/7/17 at 1:00 be there for 12:15.  Patient said she did not need the instructions again over the phone.  Writer will still mail the instructions out today.      Patient scheduled appointment for 11/7/17 at 1:00 check in at 12:15 at the [facility].     • You need a  to take you home after the procedure.  Have someone stay with you for 2 hours after the procedure.  Plan to take the rest of the day off of work.    • Do not eat anything SIX hours prior to your procedure.    • Do not drink anything FOUR hours prior to your procedure.    • Are you on any blood thinning medication? yes You should continue your medications as usual, other than blood thinning medications. If you are on blood thinning medications (fish oil, aspirin, Coumadin or warfarin, Ticlid, Plavix, Low Molecular Heparin, Lovenox, Arixtra, Eliquis, Pradaxa, Xarelto, diclofenac (Voltaren), meloxicam (mobic), venlafaxine (effexor), pentoxifylline (trental) Ibuprofen, Advil, Aleve, Naproxen, Excedrin, Micaela, Multivitamins, Vitamin E, or Ginko Biloba) please let us know. We will be contacting your provider prior to your procedure. You will be instructed on what to do about these medications.  Fish oil (7)  • Patient was told to stop taking ibuprofen or NSAIDs 2 days prior to the procedure and aspirin 7 days prior to the procedure.  Patient verbalized understanding of this instruction.      • Are you diabetic on insulin? No If you are diabetic and on insulin, you may have to either hold your morning dose or only take half depending on the time of the injection.  After you start eating again after the injection, you may resume your insulin as directed by your prescribing physician.      • If you have a fever, an infection or 
treatment for an infection 72 hrs. prior to your injection time, please call our office immediately.    • According to the Missed Appointment and Late Cancellation Policy, if you are unable to keep a scheduled appointment, you must call the clinic at least 48 hours before your scheduled appointment to cancel or reschedule.      If you have any questions regarding these instructions, please [facility] [place] at [phone].    Mailed the patient the educational materials and instructions for the procedure that will be taking place.        
normal for race
The patient is a 59y year old Female complaining of cough.

## 2023-06-04 NOTE — ED PROVIDER NOTE - PHYSICAL EXAMINATION
GEN - NAD, well appearing, A&Ox3  HEAD - NC/AT  EYES - PERRL, EOMI  ENT - Airway patent, mucous membranes moist. Nasal mucosa clear. Mouth with normal mucosa. Throat has no vesicles, no oropharyngeal exudates. Uvula is midline. No drooling. No tongue swelling. Floor of mouth wnl.  NECK - +Surgical wound in anterior neck covered by Steri-Strips with tiny amount of blood on bandage. There is an area of hematoma, bruising, and swelling on the right side of the neck adjacent to the surgical wound. There is no tenderness, fluctuance, drainage, or purulent discharge.  PULMONARY - CTA b/l, symmetric breath sounds, no W/R/R  CARDIAC - +S1S2, RRR, no M/G/R, no JVD  ABDOMEN - ND, NT, soft, no guarding, no rebound, no masses, no rigidity   - No CVA TTP  EXTREMITIES - FROM, symmetric pulses, no edema  NEUROLOGIC - Alert, speech clear, no focal deficits  PSYCH - Normal mood/affect, normal insight

## 2023-06-04 NOTE — ED ADULT TRIAGE NOTE - CHIEF COMPLAINT QUOTE
Pt. arrives as transfer fro [place] for post-op neck swelling. s/p thyroidectomy on 5/31. ecchymosis noted to neck. PHx Afib, DM II, HTN, pacemaker Arrives with 20 G IV to L AC.

## 2023-06-04 NOTE — ED ADULT NURSE NOTE - OBJECTIVE STATEMENT
asn per triage nurse, " Pt. arrives as transfer fro [place] for post-op neck swelling. s/p thyroidectomy on 5/31. ecchymosis noted to neck. PHx Afib, DM II, HTN, pacemaker Arrives with 20 G IV to L AC."

## 2023-06-04 NOTE — ED PROVIDER NOTE - ENMT, MLM
Airway patent, Nasal mucosa clear. Mouth with normal mucosa. Throat has no vesicles, no oropharyngeal exudates and uvula is midline.  There is a surgical wound in anterior neck covered by Steri-Strips with tiny amount of blood on bandage.  There is an area of hematoma bruising and swelling on the right side of the neck adjacent to the surgical wound.  There is no tenderness fluctuance drainage purulence cellulitis.

## 2023-06-04 NOTE — H&P ADULT - HISTORY OF PRESENT ILLNESS
ENT H&P    HPI: 87-year-old female with history of atrial fib on Xarelto POD4 from L hemithyroidectomy now p/w neck swelling. Pt said she noticed swelling has progressively been worsening since the surgery. Over the last two days the bruising has gotten significantly worse. Denies difficulty breathing or swallowing. She has been taking xarelto since the surgery. She noted blood spotting on 2nd and 4th day around incision but no other significant bleeding - no bleeding now. Denies fevers and otherwise feels are usual state of health.    PAST MEDICAL & SURGICAL HISTORY:  Hypertension      Atrial fibrillation      Pacemaker      Diabetes  on insulin      Nontoxic multinodular goiter      Decreased white blood cell count      No significant past surgical history        No Known Allergies    MEDICATIONS  (STANDING):    MEDICATIONS  (PRN):      Objective    ICU Vital Signs Last 24 Hrs  T(C): 37.1 (04 Jun 2023 22:00), Max: 37.1 (04 Jun 2023 22:00)  T(F): 98.7 (04 Jun 2023 22:00), Max: 98.7 (04 Jun 2023 22:00)  HR: 88 (04 Jun 2023 22:00) (62 - 88)  BP: 155/66 (04 Jun 2023 22:00) (154/83 - 188/89)  BP(mean): --  ABP: --  ABP(mean): --  RR: 17 (04 Jun 2023 22:00) (16 - 18)  SpO2: 95% (04 Jun 2023 22:00) (95% - 98%)    O2 Parameters below as of 04 Jun 2023 22:00  Patient On (Oxygen Delivery Method): room air        PHYSICAL EXAM:    EARS: The right/left pinna was normal. The right/left external auditory canal was normal and the right/left TM was intact and well aerated.   NOSE: Normal external nose. Anterior nasal cavity patent with no obstruction. Inferior turbinates normally sized.  ORAL CAVITY/OROPHARYNX: normal mucosa. No erythema, lesions or bleeding.  NECK: Neck incision swollen, firm. Nontender to palpation. Significant bruising superiorly and inferiorly to incision extending to neck.  RESPIRATORY: Respirations unlabored, no increased work of breathing with use of accessory muscles and retractions. No stridor.  CARDIAC: Warm extremities, no cyanosis.                         11.9   3.82  )-----------( 135      ( 04 Jun 2023 16:50 )             34.3     06-04    130<L>  |  95<L>  |  21  ----------------------------<  126<H>  4.1   |  28  |  0.85    Ca    8.7      04 Jun 2023 16:50    TPro  6.1  /  Alb  3.3  /  TBili  0.7  /  DBili  x   /  AST  33  /  ALT  19  /  AlkPhos  91  06-04      I&O's Summary

## 2023-06-04 NOTE — ED ADULT NURSE NOTE - CHIEF COMPLAINT QUOTE
Pt. arrives as transfer fro [place] for post-op neck swelling. s/p thyroidectomy on 5/31. ecchymosis noted to neck. PHx Afib, DM II, HTN, pacemaker Arrives with 20 G IV to L AC.

## 2023-06-04 NOTE — ED ADULT NURSE NOTE - NSFALLHARMRISKINTERV_ED_ALL_ED

## 2023-06-04 NOTE — ED PROVIDER NOTE - OBJECTIVE STATEMENT
The patient is a 87y Female with pmhx of afib on Xarelto, DM II, HTN, is POD #4 from L hemithyroidectomy for possible cancerous mass p/w neck swelling/redness. Used Mandarin  to obtain hx. Pt initially presented to [place] ED earlier today. Was transferred to [facility] for ENT evaluation. CT neck done at [facility] showed, "Large soft tissue collection consistent with hematoma containing multiple pockets of air is seen in left thyroid surgical bed extending to midline anterior neck subcutaneous tissues as described above. Large focus of active extravasation of contrast on arterial phase images in left anterior aspect of the hematoma within left anterior subcutaneous tissues proximally 7 mm deep to the skin. Multiple pockets of air with fluid collections are seen within this large hematoma. Please note that infected collection cannot be excluded through imaging." Pt says she noticed swelling has progressively been worsening since the surgery. Over the last two days, the bruising has gotten significantly worse. Denies difficulty breathing or swallowing. She has been taking Xarelto since the surgery. Pt noted blood spotting on 2nd and 4th day around incision site, but no other significant bleeding. Denies fevers. NKDA.

## 2023-06-04 NOTE — ED PROVIDER NOTE - OBJECTIVE STATEMENT
87-year-old female with history of atrial fib on Xarelto had thyroidectomy at [facility] ambulatory surgery last week for possible cancer was told to discontinue Xarelto until after surgery which she then resumed.  Noted large amount of bruising swelling and bleeding from neck wound yesterday.  Denies difficulty breathing or swallowing.  Patient's daughter providing interpretation states she did not call surgeon but brought patient here for evaluation.  ENT surgeon was Dr. Wise patient's PCP and cardiologist are in [place] Dr. Villegas

## 2023-06-04 NOTE — ED PROVIDER NOTE - ATTENDING CONTRIBUTION TO CARE
The patient is a 87y Female with pmhx of afib on Xarelto, DM II, HTN, is POD #4 from L hemithyroidectomy for possible cancerous mass p/w neck swelling/redness. Used Mandarin  to obtain hx. Pt initially presented to [place] ED earlier today. Was transferred to [facility] for ENT evaluation. CT neck done at [place] showed, "Large soft tissue collection consistent with hematoma containing multiple pockets of air is seen in left thyroid surgical bed extending to midline anterior neck subcutaneous tissues as described above. Large focus of active extravasation of contrast on arterial phase images in left anterior aspect of the hematoma within left anterior subcutaneous tissues proximally 7 mm deep to the skin. Multiple pockets of air with fluid collections are seen within this large hematoma. Please note that infected collection cannot be excluded through imaging." Pt says she noticed swelling has progressively been worsening since the surgery. Over the last two days, the bruising has gotten significantly worse. Denies difficulty breathing or swallowing. She has been taking Xarelto since the surgery. Pt noted blood spotting on 2nd and 4th day around incision site, but no other significant bleeding. Denies fevers. NKDA.

## 2023-06-05 DIAGNOSIS — S10.93XA CONTUSION OF UNSPECIFIED PART OF NECK, INITIAL ENCOUNTER: ICD-10-CM

## 2023-06-05 PROBLEM — D72.819 DECREASED WHITE BLOOD CELL COUNT, UNSPECIFIED: Chronic | Status: ACTIVE | Noted: 2023-05-25

## 2023-06-05 LAB
GLUCOSE BLDC GLUCOMTR-MCNC: 117 MG/DL — HIGH (ref 70–99)
GLUCOSE BLDC GLUCOMTR-MCNC: 123 MG/DL — HIGH (ref 70–99)

## 2023-06-05 RX ORDER — AMPICILLIN SODIUM AND SULBACTAM SODIUM 250; 125 MG/ML; MG/ML
3 INJECTION, POWDER, FOR SUSPENSION INTRAMUSCULAR; INTRAVENOUS ONCE
Refills: 0 | Status: COMPLETED | OUTPATIENT
Start: 2023-06-05 | End: 2023-06-05

## 2023-06-05 RX ORDER — RIVAROXABAN 15 MG-20MG
15 KIT ORAL DAILY
Refills: 0 | Status: DISCONTINUED | OUTPATIENT
Start: 2023-06-05 | End: 2023-06-06

## 2023-06-05 RX ORDER — QUETIAPINE FUMARATE 200 MG/1
25 TABLET, FILM COATED ORAL AT BEDTIME
Refills: 0 | Status: DISCONTINUED | OUTPATIENT
Start: 2023-06-05 | End: 2023-06-06

## 2023-06-05 RX ORDER — ZOLPIDEM TARTRATE 10 MG/1
5 TABLET ORAL AT BEDTIME
Refills: 0 | Status: DISCONTINUED | OUTPATIENT
Start: 2023-06-05 | End: 2023-06-06

## 2023-06-05 RX ORDER — AMPICILLIN SODIUM AND SULBACTAM SODIUM 250; 125 MG/ML; MG/ML
3 INJECTION, POWDER, FOR SUSPENSION INTRAMUSCULAR; INTRAVENOUS EVERY 6 HOURS
Refills: 0 | Status: DISCONTINUED | OUTPATIENT
Start: 2023-06-05 | End: 2023-06-06

## 2023-06-05 RX ORDER — LOSARTAN POTASSIUM 100 MG/1
50 TABLET, FILM COATED ORAL DAILY
Refills: 0 | Status: DISCONTINUED | OUTPATIENT
Start: 2023-06-05 | End: 2023-06-05

## 2023-06-05 RX ORDER — METOPROLOL TARTRATE 50 MG
50 TABLET ORAL
Refills: 0 | Status: DISCONTINUED | OUTPATIENT
Start: 2023-06-05 | End: 2023-06-06

## 2023-06-05 RX ORDER — LOSARTAN POTASSIUM 100 MG/1
50 TABLET, FILM COATED ORAL DAILY
Refills: 0 | Status: DISCONTINUED | OUTPATIENT
Start: 2023-06-05 | End: 2023-06-06

## 2023-06-05 RX ORDER — AMLODIPINE BESYLATE 2.5 MG/1
5 TABLET ORAL DAILY
Refills: 0 | Status: DISCONTINUED | OUTPATIENT
Start: 2023-06-05 | End: 2023-06-06

## 2023-06-05 RX ORDER — OXCARBAZEPINE 300 MG/1
600 TABLET, FILM COATED ORAL
Refills: 0 | Status: DISCONTINUED | OUTPATIENT
Start: 2023-06-05 | End: 2023-06-06

## 2023-06-05 RX ORDER — AMPICILLIN SODIUM AND SULBACTAM SODIUM 250; 125 MG/ML; MG/ML
INJECTION, POWDER, FOR SUSPENSION INTRAMUSCULAR; INTRAVENOUS
Refills: 0 | Status: DISCONTINUED | OUTPATIENT
Start: 2023-06-05 | End: 2023-06-06

## 2023-06-05 RX ORDER — METOPROLOL TARTRATE 50 MG
50 TABLET ORAL
Refills: 0 | Status: DISCONTINUED | OUTPATIENT
Start: 2023-06-05 | End: 2023-06-05

## 2023-06-05 RX ORDER — AMLODIPINE BESYLATE 2.5 MG/1
5 TABLET ORAL DAILY
Refills: 0 | Status: DISCONTINUED | OUTPATIENT
Start: 2023-06-05 | End: 2023-06-05

## 2023-06-05 RX ORDER — SODIUM CHLORIDE 9 MG/ML
1000 INJECTION, SOLUTION INTRAVENOUS
Refills: 0 | Status: DISCONTINUED | OUTPATIENT
Start: 2023-06-05 | End: 2023-06-06

## 2023-06-05 RX ADMIN — AMPICILLIN SODIUM AND SULBACTAM SODIUM 200 GRAM(S): 250; 125 INJECTION, POWDER, FOR SUSPENSION INTRAMUSCULAR; INTRAVENOUS at 12:25

## 2023-06-05 RX ADMIN — AMPICILLIN SODIUM AND SULBACTAM SODIUM 200 GRAM(S): 250; 125 INJECTION, POWDER, FOR SUSPENSION INTRAMUSCULAR; INTRAVENOUS at 01:29

## 2023-06-05 RX ADMIN — Medication 1000 MILLIGRAM(S): at 00:00

## 2023-06-05 RX ADMIN — AMPICILLIN SODIUM AND SULBACTAM SODIUM 200 GRAM(S): 250; 125 INJECTION, POWDER, FOR SUSPENSION INTRAMUSCULAR; INTRAVENOUS at 17:46

## 2023-06-05 RX ADMIN — LOSARTAN POTASSIUM 50 MILLIGRAM(S): 100 TABLET, FILM COATED ORAL at 05:55

## 2023-06-05 RX ADMIN — OXCARBAZEPINE 600 MILLIGRAM(S): 300 TABLET, FILM COATED ORAL at 19:44

## 2023-06-05 RX ADMIN — ZOLPIDEM TARTRATE 5 MILLIGRAM(S): 10 TABLET ORAL at 22:49

## 2023-06-05 RX ADMIN — AMPICILLIN SODIUM AND SULBACTAM SODIUM 200 GRAM(S): 250; 125 INJECTION, POWDER, FOR SUSPENSION INTRAMUSCULAR; INTRAVENOUS at 05:54

## 2023-06-05 RX ADMIN — QUETIAPINE FUMARATE 25 MILLIGRAM(S): 200 TABLET, FILM COATED ORAL at 22:49

## 2023-06-05 RX ADMIN — RIVAROXABAN 15 MILLIGRAM(S): KIT at 22:49

## 2023-06-05 RX ADMIN — AMLODIPINE BESYLATE 5 MILLIGRAM(S): 2.5 TABLET ORAL at 05:54

## 2023-06-05 RX ADMIN — Medication 50 MILLIGRAM(S): at 05:54

## 2023-06-05 RX ADMIN — Medication 50 MILLIGRAM(S): at 17:46

## 2023-06-05 RX ADMIN — AMPICILLIN SODIUM AND SULBACTAM SODIUM 200 GRAM(S): 250; 125 INJECTION, POWDER, FOR SUSPENSION INTRAMUSCULAR; INTRAVENOUS at 23:33

## 2023-06-05 NOTE — ED ADULT NURSE REASSESSMENT NOTE - NS ED NURSE REASSESS COMMENT FT1
Break Coverage RN: Pt resting in stretcher, offers no complaints, respirations equal and unlabored. Medicated as per EMR orders. Pt admitted, pending inpatient bed assignment. No acute distress noted. Safety maintained.

## 2023-06-05 NOTE — PATIENT PROFILE ADULT - HAVE YOU HAD COVID IN THE LAST 60 DAYS?
Referred by: Rio Oquendo MD; Medical Diagnosis (from order):    Diagnosis Information      Diagnosis    716.96 (ICD-9-CM) - M17.12 (ICD-10-CM) - Arthritis of left knee                Physical Therapy -  Daily Treatment Note    Visit:  7     SUBJECTIVE                                                                                                             Patient reports that nights and evenings are bad.  He still feels tight and numb in and around the knee.  Patient did not bring cane in today. Going up steps he is able to go about 1/2 way up his steps reciprocally and then has to do step too pattern.      Pain / Symptoms:  Pain rating (out of 10): Current: 3     OBJECTIVE                                                                                                                     Range of Motion (ROM) (norms in parentheses, measurement in degrees unless noted):     Knee Flexion (150): Left: Active: 117 Passive: 119        TREATMENT                                                                                                                  Therapeutic Exercise:  Airdyne bike seat 6 rocking x6.5 mins  And around x1 min  Supine heel slide with self overpressure with belt, 1 x 15 L  Supine heel slide with manual overpressure 1 x 5 L with 5 sec hold  Quad sets x10   Step down anterior on 4 inch step x15  Lateral step down on 6 inch step x15  Cross over gastroc stretch 3x30 seconds left  Heel raises x10 left             Manual Therapy:      Neuromuscular Re-Education:  Step up on pro step 360 into SLS with cues not to circumduct the leg x10   Dynamic marching in hallway x40 feet  Tandem walking in hallway x40 feet    Gait Training:          Skilled input: verbal instruction/cues and tactile instruction/cues    Home Exercise Program: (*above indicates provided as part of home exercise program)  Eval: quad sets, heel slides, long sitting calf stretch, seated hamstring stretch, ankle pumps      ASSESSMENT                                                                                                                  Patient only a few degrees away from 120 for his knee flexion.  He has increased toe out during most exercises with reports of anterior knee pain.  Fatigued with lower extremity strengthening through steps.  Challenged with balance.   Patient Education:   Results of above outlined education: Verbalizes understanding   PLAN                                                                                                                             Suggestions for next session as indicated: Progress per plan of care, balance dynamic and static, lower extremity strengthening-with steps, glute max strengthening-squats, attempt lunges as able, check ROM at end of session       Procedures and total treatment time documented Time Entry flowsheet.     No

## 2023-06-05 NOTE — PATIENT PROFILE ADULT - FALL HARM RISK - HARM RISK INTERVENTIONS

## 2023-06-06 ENCOUNTER — TRANSCRIPTION ENCOUNTER (OUTPATIENT)
Age: 88
End: 2023-06-06

## 2023-06-06 VITALS
SYSTOLIC BLOOD PRESSURE: 135 MMHG | HEART RATE: 74 BPM | OXYGEN SATURATION: 99 % | DIASTOLIC BLOOD PRESSURE: 77 MMHG | TEMPERATURE: 98 F | RESPIRATION RATE: 18 BRPM

## 2023-06-06 PROBLEM — E04.2 NONTOXIC MULTINODULAR GOITER: Chronic | Status: ACTIVE | Noted: 2023-05-25

## 2023-06-06 LAB — GLUCOSE BLDC GLUCOMTR-MCNC: 110 MG/DL — HIGH (ref 70–99)

## 2023-06-06 RX ORDER — ACETAMINOPHEN 500 MG
650 TABLET ORAL ONCE
Refills: 0 | Status: COMPLETED | OUTPATIENT
Start: 2023-06-06 | End: 2023-06-06

## 2023-06-06 RX ORDER — HYDROCHLOROTHIAZIDE 25 MG
1 TABLET ORAL
Qty: 30 | Refills: 0
Start: 2023-06-06 | End: 2023-07-05

## 2023-06-06 RX ORDER — AMLODIPINE BESYLATE 2.5 MG/1
1 TABLET ORAL
Qty: 30 | Refills: 0
Start: 2023-06-06 | End: 2023-07-05

## 2023-06-06 RX ADMIN — Medication 50 MILLIGRAM(S): at 17:45

## 2023-06-06 RX ADMIN — AMPICILLIN SODIUM AND SULBACTAM SODIUM 200 GRAM(S): 250; 125 INJECTION, POWDER, FOR SUSPENSION INTRAMUSCULAR; INTRAVENOUS at 05:47

## 2023-06-06 RX ADMIN — AMPICILLIN SODIUM AND SULBACTAM SODIUM 200 GRAM(S): 250; 125 INJECTION, POWDER, FOR SUSPENSION INTRAMUSCULAR; INTRAVENOUS at 18:08

## 2023-06-06 RX ADMIN — Medication 650 MILLIGRAM(S): at 17:45

## 2023-06-06 RX ADMIN — RIVAROXABAN 15 MILLIGRAM(S): KIT at 12:01

## 2023-06-06 RX ADMIN — AMPICILLIN SODIUM AND SULBACTAM SODIUM 200 GRAM(S): 250; 125 INJECTION, POWDER, FOR SUSPENSION INTRAMUSCULAR; INTRAVENOUS at 11:03

## 2023-06-06 RX ADMIN — Medication 650 MILLIGRAM(S): at 18:16

## 2023-06-06 NOTE — DISCHARGE NOTE NURSING/CASE MANAGEMENT/SOCIAL WORK - NSDCPEFALRISK_GEN_ALL_CORE
For information on Fall & Injury Prevention, visit: [email or web address] OR  [email or web address] OR  https://www.cdc.gov/steadi/patient.html

## 2023-06-06 NOTE — PROGRESS NOTE ADULT - SUBJECTIVE AND OBJECTIVE BOX
OTOLARYNGOLOGY (ENT) PROGRESS NOTE    PATIENT: LASHELL ANN  MRN: [id number]  : 37  [id number]  DATE OF SERVICE:  23  	  Subjective/ Interval:   AFVSS. No acute events overnight. Patient seen and examined at bedside. No difficulty breathing this morning.    ALLERGIES:  No Known Allergies      MEDICATIONS:  Antiinfectives:   ampicillin/sulbactam  IVPB      ampicillin/sulbactam  IVPB 3 Gram(s) IV Intermittent every 6 hours    IV fluids:    Hematologic/Anticoagulation:  rivaroxaban 15 milliGRAM(s) Oral daily    Pain medications/Neuro:  OXcarbazepine 600 milliGRAM(s) Oral <User Schedule>  QUEtiapine 25 milliGRAM(s) Oral at bedtime  zolpidem 5 milliGRAM(s) Oral at bedtime PRN  zolpidem 5 milliGRAM(s) Oral at bedtime PRN    Endocrine Medications:     All other standing medications:   amLODIPine   Tablet 5 milliGRAM(s) Oral daily  hydrochlorothiazide 12.5 milliGRAM(s) Oral daily  losartan 50 milliGRAM(s) Oral daily  metoprolol succinate ER 50 milliGRAM(s) Oral two times a day    All other PRN medications:    Vital Signs Last 24 Hrs  T(C): 36.7 (2023 03:03), Max: 37.1 (2023 22:00)  T(F): 98 (2023 03:03), Max: 98.7 (2023 22:00)  HR: 68 (2023 03:03) (62 - 88)  BP: 163/94 (2023 03:03) (146/74 - 188/89)  BP(mean): --  RR: 18 (2023 03:03) (16 - 18)  SpO2: 100% (2023 03:03) (95% - 100%)    Parameters below as of 2023 03:03  Patient On (Oxygen Delivery Method): room air    PE:  General: well-developed, NAD  Eyes: EOMI; PERRL; no drainage or redness  Ears: external ears normal  Nose: nares patent  Mouth: No oral lesions; no gross abnormalities  Neck: Neck incision swollen, firm. Nontender to palpation. Significant bruising superiorly and inferiorly to incision extending to neck.  Respiratory: unlabored respirations  Cardiovascular: regular rate  Gastrointestinal: Soft, nondistended  Extremities: No edema, warm and well perfused  Skin: No lesions; no rash                   LABS                       11.9   3.82  )-----------( 135      ( 2023 16:50 )             34.3        130<L>  |  95<L>  |  21  ----------------------------<  126<H>  4.1   |  28  |  0.85    Ca    8.7      2023 16:50    TPro  6.1  /  Alb  3.3  /  TBili  0.7  /  DBili  x   /  AST  33  /  ALT  19  /  AlkPhos  91           Coagulation Studies-   PT/INR - ( 2023 16:50 )   PT: 18.5 sec;   INR: 1.56 ratio         PTT - ( 2023 16:50 )  PTT:36.5 sec    Endocrine Panel-  Calcium, Total Serum: 8.7 mg/dL ( @ 16:50)                MICROBIOLOGY:            
OTOLARYNGOLOGY (ENT) PROGRESS NOTE    PATIENT: LASHELL ANN  MRN: [id number]  : 37  [id number]  DATE OF SERVICE:  23  	  Subjective/ Interval:   AFVSS. No acute events overnight. Patient seen and examined at bedside.    ALLERGIES:  No Known Allergies      MEDICATIONS:  Antiinfectives:   ampicillin/sulbactam  IVPB      ampicillin/sulbactam  IVPB 3 Gram(s) IV Intermittent every 6 hours    IV fluids:  lactated ringers. 1000 milliLiter(s) IV Continuous <Continuous>    Hematologic/Anticoagulation:  rivaroxaban 15 milliGRAM(s) Oral daily    Pain medications/Neuro:  OXcarbazepine 600 milliGRAM(s) Oral <User Schedule>  QUEtiapine 25 milliGRAM(s) Oral at bedtime  zolpidem 5 milliGRAM(s) Oral at bedtime PRN  zolpidem 5 milliGRAM(s) Oral at bedtime PRN    Endocrine Medications:     All other standing medications:   amLODIPine   Tablet 5 milliGRAM(s) Oral daily  hydrochlorothiazide 12.5 milliGRAM(s) Oral daily  losartan 50 milliGRAM(s) Oral daily  metoprolol succinate ER 50 milliGRAM(s) Oral two times a day    All other PRN medications:    Vital Signs Last 24 Hrs  T(C): 36.4 (2023 02:00), Max: 37.6 (2023 22:00)  T(F): 97.6 (2023 02:00), Max: 99.7 (2023 22:00)  HR: 67 (2023 02:00) (67 - 75)  BP: 107/60 (2023 02:00) (107/60 - 148/87)  BP(mean): --  RR: 18 (2023 02:00) (18 - 18)  SpO2: 99% (2023 02:00) (97% - 100%)    Parameters below as of 2023 02:00  Patient On (Oxygen Delivery Method): room air           @ 07:01  -   @ 07:00  --------------------------------------------------------  IN:    IV PiggyBack: 300 mL    Lactated Ringers: 1300 mL    Oral Fluid: 120 mL  Total IN: 1720 mL    OUT:    Voided (mL): 0 mL  Total OUT: 0 mL    Total NET: 1720 mL    PE:  General: well-developed, NAD  Eyes: EOMI; PERRL; no drainage or redness  Ears: external ears normal  Nose: nares patent  Mouth: No oral lesions; no gross abnormalities  Neck: Neck incision swollen and firm but greatly decreasing. Nontender to palpation. Significant bruising superiorly and inferiorly to incision extending to neck.   Respiratory: unlabored respirations  Cardiovascular: regular rate  Gastrointestinal: Soft, nondistended  Extremities: No edema, warm and well perfused  Skin: No lesions; no rash    LABS                       11.9   3.82  )-----------( 135      ( 2023 16:50 )             34.3    06-04    130<L>  |  95<L>  |  21  ----------------------------<  126<H>  4.1   |  28  |  0.85    Ca    8.7      2023 16:50    TPro  6.1  /  Alb  3.3  /  TBili  0.7  /  DBili  x   /  AST  33  /  ALT  19  /  AlkPhos  91  06-04         Coagulation Studies-   PT/INR - ( 2023 16:50 )   PT: 18.5 sec;   INR: 1.56 ratio         PTT - ( 2023 16:50 )  PTT:36.5 sec    Endocrine Panel-                MICROBIOLOGY:

## 2023-06-06 NOTE — DISCHARGE NOTE PROVIDER - NSDCMRMEDTOKEN_GEN_ALL_CORE_FT
alendronate 70 mg oral tablet: 1 tab(s) orally once a week Saturdays  amLODIPine 5 mg oral tablet: 1 tab(s) orally once a day  amoxicillin-clavulanate 875 mg-125 mg oral tablet: 1 tab(s) orally 2 times a day  calcium + vitamin D: take 1 capsule orally once a day  folic acid: 1 mg tab, take 1 tab orally once a day  hydroCHLOROthiazide 12.5 mg oral capsule: 1 cap(s) orally once a day  metoprolol succinate 50 mg oral capsule, extended release: 1 cap(s) orally 2 times a day  olmesartan 20mg-amlodipine 5mg-hctz 12.5mg: take 1 tab orally once a day  OXcarbazepine 300 mg oral tablet: 2 tab(s) orally once a day (at bedtime)  QUEtiapine 25 mg oral tablet: 1 tab(s) orally once a day (at bedtime)  vitamin b12: 100 mg tab, take 1 tab orally once a day  vitamin d 50,000 iu: take 1 cap orally once a week  Xarelto 15 mg oral tablet: 1 tab(s) orally once a day  zolpidem 10 mg oral tablet: 1 tab(s) orally once a day (at bedtime)

## 2023-06-06 NOTE — DISCHARGE NOTE PROVIDER - HOSPITAL COURSE
S/P left hemithyroid. Patient presented with neck hematoma. Observed 48 hours. Swelling improved. patient was cleared for discharge on 6/6/23.

## 2023-06-06 NOTE — DISCHARGE NOTE PROVIDER - CARE PROVIDER_API CALL
Frandy Dev  Otolaryngology  [address] [place] [id number]  Phone: [phone]  Fax: [phone]  Follow Up Time:

## 2023-06-06 NOTE — DISCHARGE NOTE NURSING/CASE MANAGEMENT/SOCIAL WORK - PATIENT PORTAL LINK FT
You can access the FollowMyHealth Patient Portal offered by [facility] by registering at the following website: [email or web address]. By joining [facility]’s FollowMyHealth portal, you will also be able to view your health information using other applications (apps) compatible with our system.

## 2023-06-06 NOTE — DISCHARGE NOTE PROVIDER - NSDCFUSCHEDAPPT_GEN_ALL_CORE_FT
[facility]  OTOLARYNG [address] [facility]  Scheduled Appointment: 06/07/2023

## 2023-06-06 NOTE — DISCHARGE NOTE NURSING/CASE MANAGEMENT/SOCIAL WORK - NSDCCRNAME_GEN_ALL_CORE_FT
Medicaid CDPAP services were reinstated to resume upon discharge w/ [employer] [facility].

## 2023-06-06 NOTE — PROGRESS NOTE ADULT - ASSESSMENT
87F PMH afib on Xarelto POD4 from L hemithyroidectomy who presented with neck hematoma on 6/4. Improving today.    Plan:  - On soft diet  - Plan to d/c today with PO Augmentin
87F PMH afib on Xarelto POD4 from L hemithyroidectomy now p/w progressive neck hematoma on 6/4.    Plan:  - Pending OR plan  - NPO  - F/u Am labs (h/h)

## 2023-06-07 ENCOUNTER — APPOINTMENT (OUTPATIENT)
Dept: OTOLARYNGOLOGY | Facility: CLINIC | Age: 88
End: 2023-06-07
Payer: MEDICARE

## 2023-06-07 LAB — SURGICAL PATHOLOGY STUDY: SIGNIFICANT CHANGE UP

## 2023-06-07 PROCEDURE — 99024 POSTOP FOLLOW-UP VISIT: CPT

## 2023-06-07 NOTE — PHYSICAL EXAM
[de-identified] : Wound clean and intact mild swelling, non tenderness, erythema or discharge. + bruise around the neck and anterior chest.

## 2023-06-07 NOTE — HISTORY OF PRESENT ILLNESS
[de-identified] : Pt is here for post left  thyroid lobectomy and isthmusectomy for thyroid nodule on 5/31/2023. Pt was in ED for swelling of the neck on  6/5/2023 and admitted for observation for neck hematoma and discharge on  6/6/2023.  Pt is bruise at the neck and chest, and some swelling at the site of surgery.  PT has no voice change. Pt also has no neck mass, no difficulty swallowing and no painful swallowing. Path is pending.

## 2023-06-19 ENCOUNTER — APPOINTMENT (OUTPATIENT)
Dept: OTOLARYNGOLOGY | Facility: CLINIC | Age: 88
End: 2023-06-19
Payer: MEDICARE

## 2023-06-19 VITALS
SYSTOLIC BLOOD PRESSURE: 155 MMHG | WEIGHT: 122 LBS | DIASTOLIC BLOOD PRESSURE: 86 MMHG | BODY MASS INDEX: 23.95 KG/M2 | HEART RATE: 78 BPM | HEIGHT: 60 IN | TEMPERATURE: 97.2 F

## 2023-06-19 PROCEDURE — 99024 POSTOP FOLLOW-UP VISIT: CPT

## 2023-06-19 PROCEDURE — 31575 DIAGNOSTIC LARYNGOSCOPY: CPT | Mod: 79

## 2023-06-19 NOTE — HISTORY OF PRESENT ILLNESS
[de-identified] : Pt is here for post left  thyroid lobectomy and isthmusectomy for thyroid nodule on 5/31/2023. Pt was in ED for swelling of the neck on  6/5/2023 and admitted for observation for neck hematoma and discharge on  6/6/2023. Pt is here to f/u and bruise at the neck and chest almost resolved and the swelling at the site of surgery with bruise.  No difficulty swallowing and no painful swallowing. Path  Adenomatoid nodules and benign lymph node.

## 2023-06-19 NOTE — PHYSICAL EXAM
[de-identified] : Brusing is significantly improved, there is some swelling along the incision site likely secondary to resolving superficial hematoma, no TTP, no LAD. [Midline] : trachea located in midline position [Laryngoscopy Performed] : laryngoscopy was performed, see procedure section for findings [Normal] : no rashes

## 2023-06-19 NOTE — PROCEDURE
[Gag Reflex] : gag reflex preventing mirror examination [None] : none [Flexible Endoscope] : examined with the flexible endoscope [Serial Number: ___] : Serial Number: [unfilled] [de-identified] : No lesions in the NPx, OPx, HPx or larynx.  VC are mobile, airway patent.\par

## 2023-08-11 ENCOUNTER — APPOINTMENT (OUTPATIENT)
Dept: OTOLARYNGOLOGY | Facility: CLINIC | Age: 88
End: 2023-08-11
Payer: MEDICARE

## 2023-08-11 VITALS
HEIGHT: 60 IN | WEIGHT: 122 LBS | DIASTOLIC BLOOD PRESSURE: 83 MMHG | SYSTOLIC BLOOD PRESSURE: 143 MMHG | HEART RATE: 75 BPM | BODY MASS INDEX: 23.95 KG/M2

## 2023-08-11 DIAGNOSIS — E04.9 NONTOXIC GOITER, UNSPECIFIED: ICD-10-CM

## 2023-08-11 LAB
T3FREE SERPL-MCNC: 2.31 PG/ML
T4 FREE SERPL-MCNC: 0.9 NG/DL
TSH SERPL-ACNC: 1.59 UIU/ML

## 2023-08-11 PROCEDURE — 99024 POSTOP FOLLOW-UP VISIT: CPT

## 2023-08-11 NOTE — HISTORY OF PRESENT ILLNESS
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  Patient doing well since last visit  No complaints or concerns today.

## 2023-08-11 NOTE — REASON FOR VISIT
[Subsequent Evaluation] : a subsequent evaluation for [Family Member] : family member [FreeTextEntry2] : thyroid mass

## 2023-08-11 NOTE — PHYSICAL EXAM
[de-identified] : Brusing is resolved, incision healing well, no LAD. [Midline] : trachea located in midline position [Normal] : no rashes

## 2023-11-27 NOTE — ED ADULT TRIAGE NOTE - BP NONINVASIVE DIASTOLIC (MM HG)
----- Message from MATT Harrison sent at 11/24/2023 12:45 PM CST -----  Clue cells negative- Yeast noted on wet prep.  Urine ok- did not reflex to culture.  Patient stated she had been having menstrual bleeding which would account for the blood on her UA. She has already been prescribed Diflucan.    115

## 2024-03-11 ENCOUNTER — NON-APPOINTMENT (OUTPATIENT)
Age: 89
End: 2024-03-11

## 2024-03-12 ENCOUNTER — APPOINTMENT (OUTPATIENT)
Dept: OTOLARYNGOLOGY | Facility: CLINIC | Age: 89
End: 2024-03-12
Payer: MEDICARE

## 2024-03-12 VITALS
BODY MASS INDEX: 24.54 KG/M2 | WEIGHT: 125 LBS | DIASTOLIC BLOOD PRESSURE: 80 MMHG | OXYGEN SATURATION: 97 % | HEART RATE: 84 BPM | HEIGHT: 60 IN | SYSTOLIC BLOOD PRESSURE: 129 MMHG

## 2024-03-12 DIAGNOSIS — E04.2 NONTOXIC MULTINODULAR GOITER: ICD-10-CM

## 2024-03-12 PROCEDURE — 76536 US EXAM OF HEAD AND NECK: CPT

## 2024-03-12 PROCEDURE — 99213 OFFICE O/P EST LOW 20 MIN: CPT

## 2024-03-12 NOTE — PHYSICAL EXAM
[Midline] : trachea located in midline position [Normal] : no rashes [de-identified] : Brusing is resolved, incision healing well, no LAD.

## 2024-03-12 NOTE — HISTORY OF PRESENT ILLNESS
[de-identified] : 88 year old female following up for thyroid mass. Patient is s/p Left substernal thyroidectomy 5/31/23. 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 Report doing well since last clinic visit--no new issues or complaints.  Patient denies throat pain, globus sensation, dysphagia, odynophagia, dyspnea, dysphonia, hemoptysis, otalgia, recent fevers or infections.     Patient doing well since last visit  No complaints or concerns today.

## 2024-03-12 NOTE — DATA REVIEWED
[de-identified] : US today R. thyroid lobe measures 3.6 x 2.8 x 6.7 cm.  There are multiple nodules with the largest one measuring 2.46 x 1.38 x 2.32 cm.  There is a paraisthmic nodule which measures 1.16 x 0.89 x 1.24 cm.  There is an inferior nodule which measures 1.89 x 1.01 x 1.67 cm.  No LAD.  No nodularity in the left thyroid bed. [de-identified] : TFTs from January 2024 reviewed - essentially normal.

## 2024-03-25 NOTE — H&P PST ADULT - GENITOURINARY FEMALE
After obtaining consent, and per orders of Vida Serna, injection of vivitrol given in Left vastus lateralis by Xiomara Apodaca MA. Patient instructed to remain in clinic for 20 minutes afterwards, and to report any adverse reaction to me immediately.     breast exam

## 2024-07-05 NOTE — ED ADULT NURSE NOTE - RECENT EXPOSURE TO
Plan of care discussed with patient and significant other.  Patient and significant other verbalized understanding of plan of care and all of their questions were answered.      none known

## 2024-09-10 ENCOUNTER — APPOINTMENT (OUTPATIENT)
Dept: OTOLARYNGOLOGY | Facility: CLINIC | Age: 89
End: 2024-09-10

## 2024-09-10 NOTE — DATA REVIEWED
[de-identified] : TFTs from January 2024 reviewed - essentially normal. [de-identified] : US today R. thyroid lobe measures 3.6 x 2.8 x 6.7 cm.  There are multiple nodules with the largest one measuring 2.46 x 1.38 x 2.32 cm.  There is a paraisthmic nodule which measures 1.16 x 0.89 x 1.24 cm.  There is an inferior nodule which measures 1.89 x 1.01 x 1.67 cm.  No LAD.  No nodularity in the left thyroid bed.

## 2024-09-10 NOTE — HISTORY OF PRESENT ILLNESS
[de-identified] : 89 year old female following up for thyroid mass. Patient is s/p Left substernal thyroidectomy 5/31/23. 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  Patient doing well since last visit  No complaints or concerns today.

## 2025-03-24 NOTE — ED ADULT NURSE NOTE - BEFAST FACE
Chief Complaint   Patient presents with     Right Knee - Pain, New Patient       There were no vitals filed for this visit.    There is no height or weight on file to calculate BMI.      LOR Cool NREMT                       caffeine No

## (undated) DEVICE — DRSG CURITY GAUZE SPONGE 4 X 4" 12-PLY

## (undated) DEVICE — STAPLER SKIN VISI-STAT 35 WIDE

## (undated) DEVICE — DRSG STERISTRIPS 0.5 X 4"

## (undated) DEVICE — DRAPE INSTRUMENT POUCH 6.75" X 11"

## (undated) DEVICE — DRAPE SPLIT SHEET 77" X 120"

## (undated) DEVICE — RUBBER BANDS STERILE

## (undated) DEVICE — BIPOLAR FORCEP KIRWAN JEWELERS STR 4" X 0.4MM W 12FT CORD (GREEN)

## (undated) DEVICE — PACK HEAD & NECK

## (undated) DEVICE — DRAPE TOWEL BLUE 17" X 24"

## (undated) DEVICE — GLV 8.5 PROTEXIS (WHITE)

## (undated) DEVICE — SUT PROLENE 5-0 18" PS-2

## (undated) DEVICE — LABELS BLANK W PEN

## (undated) DEVICE — PREP BETADINE SPONGE STICKS

## (undated) DEVICE — DRSG BENZOIN 0.6CC

## (undated) DEVICE — SUT VICRYL 3-0 27" SH UNDYED

## (undated) DEVICE — NERVE STIMULATOR/LOCATOR HEAD AND NECK MODEL

## (undated) DEVICE — WARMING BLANKET LOWER ADULT

## (undated) DEVICE — TONSIL ROLLS

## (undated) DEVICE — ELCTR BOVIE PENCIL SMOKE EVACUATION

## (undated) DEVICE — DRAIN RESERVOIR FOR JACKSON PRATT 100CC CARDINAL

## (undated) DEVICE — DRSG TEGADERM 4X4.75"

## (undated) DEVICE — GLV 8 PROTEXIS (WHITE)

## (undated) DEVICE — DRAIN BLAKE 10FR ROUND

## (undated) DEVICE — SUT BIOSYN 4-0 18" P-12

## (undated) DEVICE — ELCTR GROUNDING PAD ADULT COVIDIEN

## (undated) DEVICE — VENODYNE/SCD SLEEVE CALF MEDIUM

## (undated) DEVICE — SOL IRR POUR H2O 500ML

## (undated) DEVICE — SPONGE PEANUT AUTO COUNT

## (undated) DEVICE — CANISTER DISPOSABLE THIN WALL 3000CC

## (undated) DEVICE — SOL IRR POUR NS 0.9% 500ML

## (undated) DEVICE — SUT SILK 2-0 30" SH

## (undated) DEVICE — SUT SILK 2-0 18" FS

## (undated) DEVICE — DRAPE MAGNETIC INSTRUMENT MEDIUM

## (undated) DEVICE — DRAPE 3/4 SHEET 52X76"